# Patient Record
Sex: MALE | Race: WHITE | NOT HISPANIC OR LATINO | Employment: PART TIME | URBAN - METROPOLITAN AREA
[De-identification: names, ages, dates, MRNs, and addresses within clinical notes are randomized per-mention and may not be internally consistent; named-entity substitution may affect disease eponyms.]

---

## 2020-08-05 PROBLEM — H26.492 LEFT POSTERIOR CAPSULAR OPACIFICATION: Status: ACTIVE | Noted: 2020-08-05

## 2020-09-16 PROBLEM — H01.02A SQUAMOUS BLEPHARITIS OF UPPER AND LOWER EYELIDS OF BOTH EYES: Status: ACTIVE | Noted: 2020-09-16

## 2020-09-16 PROBLEM — H18.832 RECURRENT EROSION OF LEFT CORNEA: Status: ACTIVE | Noted: 2020-09-16

## 2020-09-16 PROBLEM — H02.89 DISTICHIASIS: Status: ACTIVE | Noted: 2020-09-16

## 2020-09-16 PROBLEM — H01.02B SQUAMOUS BLEPHARITIS OF UPPER AND LOWER EYELIDS OF BOTH EYES: Status: ACTIVE | Noted: 2020-09-16

## 2020-09-16 PROBLEM — H02.055 TRICHIASIS OF LEFT LOWER EYELID: Status: ACTIVE | Noted: 2020-09-16

## 2021-03-17 PROBLEM — H17.9: Status: ACTIVE | Noted: 2021-03-17

## 2021-04-13 PROBLEM — Z98.890 S/P EYE SURGERY: Status: ACTIVE | Noted: 2021-04-13

## 2022-02-16 PROBLEM — H17.9: Status: ACTIVE | Noted: 2022-02-16

## 2022-06-16 PROBLEM — H02.826 EYELID CYST, LEFT: Status: ACTIVE | Noted: 2022-06-16

## 2022-12-30 PROBLEM — K27.9 PEPTIC ULCER DISEASE: Status: ACTIVE | Noted: 2022-12-30

## 2022-12-30 PROBLEM — Z95.2 H/O AORTIC VALVE REPLACEMENT: Status: ACTIVE | Noted: 2022-12-30

## 2022-12-30 PROBLEM — K70.31 ALCOHOLIC CIRRHOSIS OF LIVER WITH ASCITES: Status: ACTIVE | Noted: 2022-12-30

## 2022-12-30 PROBLEM — E11.9 TYPE 2 DIABETES MELLITUS WITHOUT COMPLICATION, WITHOUT LONG-TERM CURRENT USE OF INSULIN: Status: ACTIVE | Noted: 2022-12-30

## 2022-12-30 PROBLEM — R19.7 DIARRHEA OF PRESUMED INFECTIOUS ORIGIN: Status: ACTIVE | Noted: 2022-12-30

## 2022-12-30 PROBLEM — K92.1 MELENA: Status: ACTIVE | Noted: 2022-12-30

## 2022-12-30 PROBLEM — Z95.1 HX OF CABG: Status: ACTIVE | Noted: 2022-12-30

## 2022-12-30 PROBLEM — I10 ESSENTIAL HYPERTENSION: Status: ACTIVE | Noted: 2022-12-30

## 2022-12-30 PROBLEM — E78.49 OTHER HYPERLIPIDEMIA: Status: ACTIVE | Noted: 2022-12-30

## 2023-01-01 PROBLEM — I48.0 PAROXYSMAL ATRIAL FIBRILLATION: Status: ACTIVE | Noted: 2023-01-01

## 2023-02-09 PROBLEM — I85.10 SECONDARY ESOPHAGEAL VARICES WITHOUT BLEEDING: Status: ACTIVE | Noted: 2023-02-09

## 2023-04-18 PROBLEM — I85.00 ESOPHAGEAL VARICES WITHOUT BLEEDING: Status: ACTIVE | Noted: 2023-04-18

## 2023-06-12 PROBLEM — H04.129 DRY EYE SYNDROME DUE TO MEIBOMIAN GLAND DYSFUNCTION: Status: ACTIVE | Noted: 2023-06-12

## 2023-06-12 PROBLEM — Z94.7 HISTORY OF PENETRATING KERATOPLASTY: Status: ACTIVE | Noted: 2023-06-12

## 2023-06-12 PROBLEM — H02.889 DRY EYE SYNDROME DUE TO MEIBOMIAN GLAND DYSFUNCTION: Status: ACTIVE | Noted: 2023-06-12

## 2023-06-12 PROBLEM — H26.491 RIGHT POSTERIOR CAPSULAR OPACIFICATION: Status: ACTIVE | Noted: 2023-06-12

## 2023-09-08 ENCOUNTER — TELEPHONE (OUTPATIENT)
Dept: CARDIOLOGY | Facility: CLINIC | Age: 68
End: 2023-09-08
Payer: COMMERCIAL

## 2023-09-08 NOTE — TELEPHONE ENCOUNTER
Contacted patient to schedule referral from Dr. Granados for aortic valve disease. Patient and spouse state that currently he is feeling good, and able to complete tasks such as working, and other adl's. Patients' wife states that the commute to [place] is too much time cui, and will take them both away from work, and that is not anything that they are prepared to do right now. IC staff gave patient and spouse office contact information. Patient verbalized understanding, and stated that they will call back when they are ready to make an appointment.

## 2023-09-22 ENCOUNTER — DOCUMENTATION ONLY (OUTPATIENT)
Dept: CARDIOLOGY | Facility: CLINIC | Age: 68
End: 2023-09-22
Payer: COMMERCIAL

## 2023-09-26 ENCOUNTER — OFFICE VISIT (OUTPATIENT)
Dept: CARDIOLOGY | Facility: CLINIC | Age: 68
End: 2023-09-26
Payer: COMMERCIAL

## 2023-09-26 DIAGNOSIS — Z95.2 H/O AORTIC VALVE REPLACEMENT: ICD-10-CM

## 2023-09-26 PROCEDURE — 1159F MED LIST DOCD IN RCRD: CPT | Mod: CPTII,95,, | Performed by: INTERNAL MEDICINE

## 2023-09-26 PROCEDURE — 1160F RVW MEDS BY RX/DR IN RCRD: CPT | Mod: CPTII,95,, | Performed by: INTERNAL MEDICINE

## 2023-09-26 PROCEDURE — 99214 OFFICE O/P EST MOD 30 MIN: CPT | Mod: 95,,, | Performed by: INTERNAL MEDICINE

## 2023-09-26 PROCEDURE — 1159F PR MEDICATION LIST DOCUMENTED IN MEDICAL RECORD: ICD-10-PCS | Mod: CPTII,95,, | Performed by: INTERNAL MEDICINE

## 2023-09-26 PROCEDURE — 99214 PR OFFICE/OUTPT VISIT, EST, LEVL IV, 30-39 MIN: ICD-10-PCS | Mod: 95,,, | Performed by: INTERNAL MEDICINE

## 2023-09-26 PROCEDURE — 1160F PR REVIEW ALL MEDS BY PRESCRIBER/CLIN PHARMACIST DOCUMENTED: ICD-10-PCS | Mod: CPTII,95,, | Performed by: INTERNAL MEDICINE

## 2023-09-26 NOTE — ASSESSMENT & PLAN NOTE
Significant aortic valve regurgitation and paravalvular leak from outside TTE and REESE.  He is currently optimized medically and exercises with minimal symptoms.  Lab demonstrates anemia with a hemoglobin of 7.8 and no haptoglobin available.  Recommend TAVR CTA, transesophageal echo by structural echocardiography at [facility], HTS consult, CTS consult, and clinic visit with me all in the same day and then presentation at multidisciplinary structural meeting when this data set is complete.  Patient and wife have agreed to plan and my nurse will arrange.

## 2023-09-26 NOTE — PROGRESS NOTES
New Patient - Audio Only Telehealth Visit     The patient location is: home  The chief complaint leading to consultation is:  Prosthetic aortic valve regurgitation and probable paravalvular leak.  Visit type: Virtual visit with audio only (telephone)  Total time spent with patient:  30 minutes  Total time spent on visit and documentation and chart review = 45 mins.        The reason for the audio only service rather than synchronous audio and video virtual visit was related to technical difficulties or patient preference/necessity.     Each patient to whom I provide medical services by telemedicine is:  (1) informed of the relationship between the physician and patient and the respective role of any other health care provider with respect to management of the patient; and (2) notified that they may decline to receive medical services by telemedicine and may withdraw from such care at any time. Patient verbally consented to receive this service via voice-only telephone call.       HPI:  This is a 67-year-old male with multiple medical problems including prior coronary artery bypass and aortic valve replacement 8 years prior with aortic valve who also has a history of hypertension paroxysmal atrial fibrillation esophageal varices with bleeding due to alcoholic cirrhosis who was referred by Dr. Granados for a 3rd opinion regarding possible TAVR.  In the past 1 year patient has discontinued alcohol, reduced his weight from 210 lb to 160 lb has completed cardiac rehab and is currently walking 30 minutes on the treadmill daily and performing 20 minutes hand ergometer daily with no significant physical limitations or shortness of breath.  He remains anemic with a hemoglobin of 7, has no haptoglobin available in labs and has a transesophageal echo and transthoracic echo which demonstrate no significant aortic stenosis however significant aortic regurgitation with most likely paravalvular leak.       H/O aortic valve  replacement  Significant aortic valve regurgitation and paravalvular leak from outside TTE and REESE.  He is currently optimized medically and exercises with minimal symptoms.  Lab demonstrates anemia with a hemoglobin of 7.8 and no haptoglobin available.  Recommend TAVR CTA, transesophageal echo by structural echocardiography at [facility], HTS consult, CTS consult, and clinic visit with me all in the same day and then presentation at multidisciplinary structural meeting when this data set is complete.  Patient and wife have agreed to plan and my nurse will arrange.

## 2023-10-02 DIAGNOSIS — Z95.2 H/O AORTIC VALVE REPLACEMENT: Primary | ICD-10-CM

## 2023-10-03 DIAGNOSIS — I35.1 AORTIC VALVE INSUFFICIENCY, ETIOLOGY OF CARDIAC VALVE DISEASE UNSPECIFIED: Primary | ICD-10-CM

## 2023-11-13 ENCOUNTER — HOSPITAL ENCOUNTER (OUTPATIENT)
Dept: RADIOLOGY | Facility: HOSPITAL | Age: 68
Discharge: HOME | End: 2023-11-13
Attending: INTERNAL MEDICINE
Payer: COMMERCIAL

## 2023-11-13 DIAGNOSIS — I35.1 AORTIC VALVE INSUFFICIENCY, ETIOLOGY OF CARDIAC VALVE DISEASE UNSPECIFIED: ICD-10-CM

## 2023-11-13 PROCEDURE — 74174 CTA CARDIAC TAVR_PARTNERS (XPD): ICD-10-PCS | Mod: 26,,, | Performed by: STUDENT IN AN ORGANIZED HEALTH CARE EDUCATION/TRAINING PROGRAM

## 2023-11-13 PROCEDURE — 74174 CTA ABD&PLVS W/CONTRAST: CPT | Mod: 26,,, | Performed by: STUDENT IN AN ORGANIZED HEALTH CARE EDUCATION/TRAINING PROGRAM

## 2023-11-13 PROCEDURE — 71275 CTA CARDIAC TAVR_PARTNERS (XPD): ICD-10-PCS | Mod: 26,,, | Performed by: STUDENT IN AN ORGANIZED HEALTH CARE EDUCATION/TRAINING PROGRAM

## 2023-11-13 PROCEDURE — 74174 CTA ABD&PLVS W/CONTRAST: CPT | Mod: TC

## 2023-11-13 PROCEDURE — 71275 CT ANGIOGRAPHY CHEST: CPT | Mod: 26,,, | Performed by: STUDENT IN AN ORGANIZED HEALTH CARE EDUCATION/TRAINING PROGRAM

## 2023-11-13 PROCEDURE — 71275 CT ANGIOGRAPHY CHEST: CPT | Mod: TC

## 2023-11-13 PROCEDURE — 25500020 PHARM REV CODE 255: Performed by: INTERNAL MEDICINE

## 2023-11-13 RX ADMIN — IOHEXOL 100 ML: 350 INJECTION, SOLUTION INTRAVENOUS at 03:11

## 2023-11-13 NOTE — PROGRESS NOTES
Subjective:      Patient ID: Asif Nolen is a 67 y.o. male.    Chief Complaint: No chief complaint on file.      HPI:  Asif Nolen is a 67 y.o. male who presents for surgical evaluation of bioprosthetic aortic valve regurgitation. Medical conditions include arthritis, HTN, DM2, vision loss, s/p CABG and AVR 8 years ago with Dr. Garcia, afib, alcoholic cirrhosis with esophageal varices s/p banding, thrombocytopenia (platelets 115). Reports that around February he started feeling very tired and that was when his valvular problem was discovered. He has since started cardiac rehab in August and has been going 3 times per week. He reports this has really helped him get stronger and feel better. Denies any shortness of breath with ADLs. Can walk a decent distance before he feels winded. No chest pain, lower extremity swelling. Occasional dizziness. Quit drinking. No prior strokes, seizures, blood clots, stents. Reports his father had two open heart operations. There is an extensive cardiac history among all of his father's side of the family.      Family and social history reviewed    Current Outpatient Medications   Medication Instructions    benzonatate (TESSALON) 100 MG capsule SMARTSI Capsule(s) By Mouth Every 8 Hours PRN    calcium carbonate/vitamin D3 (CALCIUM 600 + D,3, ORAL) 1 tablet, Oral, Daily    cycloSPORINE (RESTASIS) 0.05 % ophthalmic emulsion 1 drop, Both Eyes, 2 times daily    doxycycline (VIBRAMYCIN) 100 mg, Oral, 2 times daily    ergocalciferol, vitamin D2, (VITAMIN D ORAL) 1 tablet, Oral, Daily    ezetimibe (ZETIA) 10 mg tablet No dose, route, or frequency recorded.    furosemide (LASIX) 40 mg, Oral, Daily    IRON, FERROUS SULFATE, ORAL 1 tablet, Oral, Every other day    lactulose (CHRONULAC) 20 g, Oral, 2 times daily    metFORMIN (GLUCOPHAGE) 500 mg, Oral, With breakfast    omega-3 fatty acids/fish oil (FISH OIL-OMEGA-3 FATTY ACIDS) 300-1,000 mg capsule 1 capsule, Oral, Daily    omeprazole  (PRILOSEC) 20 mg, Daily    omeprazole (PRILOSEC) 10 mg, Oral, Daily    prednisoLONE acetate (PRED FORTE) 1 % DrpS 1 drop, Left Eye, 4 times daily    promethazine-codeine 6.25-10 mg/5 ml (PHENERGAN WITH CODEINE) 6.25-10 mg/5 mL syrup 5-10 mLs, Oral, Every 4 hours PRN    promethazine-dextromethorphan (PROMETHAZINE-DM) 6.25-15 mg/5 mL Syrp 5 mLs, Oral, Every 6 hours PRN    spironolactone (ALDACTONE) 25 mg, Oral, Daily    tiZANidine (ZANAFLEX) 4 MG tablet SMARTSI Tablet(s) By Mouth Every 12 Hours PRN   \    Review of patient's allergies indicates:  No Known Allergies  Past Medical History:   Diagnosis Date    Arthritis     Cataract     Coronary artery disease     Digestive disorder     Encounter for blood transfusion 2023    Heart disease     Hypertension     Type 2 diabetes mellitus without complication, without long-term current use of insulin 2022    Vision loss      Past Surgical History:   Procedure Laterality Date    CARDIAC SURGERY      CATARACT EXTRACTION Bilateral     CORNEAL TRANSPLANT Left 2021    L cubital tunnel release      open heart surg      PENETRATING KERATOPLASTY Left 3/29/2021    Procedure: KERATOPLASTY, PENETRATING;  Surgeon: Jerome Connors MD;  Location: [facility] OR;  Service: Ophthalmology;  Laterality: Left;    RETINAL DETACHMENT SURGERY      surgery keratectomy      TRANSESOPHAGEAL ECHOCARDIOGRAPHY N/A 2023    Procedure: ECHOCARDIOGRAM, TRANSESOPHAGEAL;  Surgeon: Jaswant Hercules MD;  Location: [facility] CATH LAB;  Service: Cardiology;  Laterality: N/A;     Family History       Problem Relation (Age of Onset)    Heart disease Father    No Known Problems Mother          Social History     Socioeconomic History    Marital status:    Tobacco Use    Smoking status: Former     Types: Cigarettes    Smokeless tobacco: Never    Tobacco comments:     Quit smoking 15 years ago   Substance and Sexual Activity    Alcohol use: Not Currently    Drug use: Never    Sexual  activity: Yes     Partners: Female     Social Determinants of Health     Financial Resource Strain: Low Risk  (12/30/2022)    Overall Financial Resource Strain (CARDIA)     Difficulty of Paying Living Expenses: Not hard at all   Food Insecurity: No Food Insecurity (12/30/2022)    Hunger Vital Sign     Worried About Running Out of Food in the Last Year: Never true     Ran Out of Food in the Last Year: Never true   Transportation Needs: No Transportation Needs (12/30/2022)    PRAPARE - Transportation     Lack of Transportation (Medical): No     Lack of Transportation (Non-Medical): No   Physical Activity: Sufficiently Active (12/30/2022)    Exercise Vital Sign     Days of Exercise per Week: 4 days     Minutes of Exercise per Session: 60 min   Stress: No Stress Concern Present (12/30/2022)    [employer] - Occupational Stress Questionnaire     Feeling of Stress : Not at all   Social Connections: Socially Integrated (12/30/2022)    Social Connection and Isolation Panel [NHANES]     Frequency of Communication with Friends and Family: More than three times a week     Frequency of Social Gatherings with Friends and Family: More than three times a week     Attends [employer] Services: More than 4 times per year     Active Member of Clubs or Organizations: Yes     Attends Club or Organization Meetings: More than 4 times per year     Marital Status:    Housing Stability: Unknown (12/30/2022)    Housing Stability Vital Sign     Unable to Pay for Housing in the Last Year: No     Unstable Housing in the Last Year: No       Current medications Reviewed    Review of Systems   Constitutional:  Negative for activity change.   HENT:  Negative for nosebleeds.    Eyes:  Positive for visual disturbance.   Respiratory:  Negative for shortness of breath.    Cardiovascular:  Negative for chest pain and leg swelling.   Gastrointestinal:  Negative for nausea.   Musculoskeletal:  Negative for gait problem.    Skin:  Negative for color change.   Neurological:  Positive for dizziness. Negative for seizures.   Hematological:  Does not bruise/bleed easily.   Psychiatric/Behavioral:  Negative for sleep disturbance.      Objective:   Physical Exam  Vitals reviewed.   Constitutional:       General: He is not in acute distress.     Appearance: He is well-developed. He is not diaphoretic.   HENT:      Head: Normocephalic and atraumatic.   Eyes:      Conjunctiva/sclera: Conjunctivae normal.   Neck:      Vascular: No JVD.   Cardiovascular:      Rate and Rhythm: Normal rate.   Pulmonary:      Effort: Pulmonary effort is normal. No respiratory distress.   Musculoskeletal:         General: Normal range of motion.      Cervical back: Normal range of motion.   Skin:     Coloration: Skin is not pale.   Neurological:      Mental Status: He is alert. Mental status is at baseline.   Psychiatric:         Speech: Speech normal.         Behavior: Behavior normal.         Thought Content: Thought content normal.         Judgment: Judgment normal.         Diagnostic Results:   US abdomen 11/8/23  Findings are suggestive of hepatic cirrhosis.  Recommend further evaluation with liver elastography.  No sonographic evidence of focal hepatic lesions.     REESE 7/19/23  REESE was performed to assess prosthetic aortic valve.  There is a 25 mm Perimount bioprosthetic aortic valve present. The bioprosthetic valve and the leaflets are not well visualized. Degenerative changes noted.  There is bioprosthetic aortic valve dysfunction with at least moderate, very eccentric, posteriorly directed transvalvular regurgitation. PHT between 280-315 ms. No aortic holodiastolic flow reversal.  There is paravalvular regurgitation anteriorly and posteriorly. Unable to perform accurate quantification due to anterior drop out artifact in the posterior aspect of the aortic root. Probable moderate to severe.  There is a 2-3 mm echolucent space in the posterior aspect of the  aortic root and the posterior aspect is not well visualized due to significant drop out. Recommend cardiac CT to further evaluate for structural abnormalities.  There is no hemodynamic evidence of aortic stenosis with peak velocity of 2.95 m/s, mean gradient 14 mmHg and AT <100 ms.  The left ventricle is normal in size with hyperdynamic systolic function.  The estimated ejection fraction is 70-75%.    TTE 5/31/23  The left ventricle is normal in size with mild concentric hypertrophy and normal systolic function.  The estimated ejection fraction is 65%. Normal wall motion  There is a 25 mm Perimount bioprosthetic valve in the aortic position, well-seated. Degenerative changes of the aortic prosthesis. Severe valvular regurgitation by visual assessment (doppler parameters probably underestimate due to suboptimal doppler alignment with the eccentric regurgitant jet). No holodiastolic flow reversal in descending aorta. Posteriorly directed regurgitant jet. Cannot exclude (significant valvular regurgitation limits perivalvular assessment) small perivalvular leak. No hemodynamically significant stenosis (Vmax=3.2m/s, mean PG=18, DVI=0.64, AT=87msec). Mean PG and Vmax are overestimated due to concomitant severe regurgitation  Mild mitral regurgitation.  Normal right ventricular size with normal right ventricular systolic function.  Intermediate central venous pressure (8 mmHg).  The estimated PA systolic pressure is 34 mmHg.  Consider REESE for further evaluation after GI clearance    Assessment:     Plan:     CTS Attending Note:    I have personally taken the history and examined this patient and agree with the KENZIE's note as stated above.  Very pleasant 67-year-old gentleman with previous aortic valve replacement and bypass surgery.  He now has transvalvular and perivalvular aortic insufficiency.  He had been quite symptomatic for this, but since starting cardiac rehab in August his functional status is much improved.  His  past medical history is noteworthy for cirrhosis with esophageal varices that required banding.  I reviewed his most recent lab work, and his liver functions appear normal, although he is thrombocytopenic.  I think he likely has compensated cirrhosis.  I think the best strategy would be transcatheter valve, as I think reoperation would likely lead to hepatic decompensation.  He is scheduled to see Dr. Haley today.

## 2023-11-14 ENCOUNTER — HOSPITAL ENCOUNTER (OUTPATIENT)
Dept: CARDIOLOGY | Facility: HOSPITAL | Age: 68
Discharge: HOME | End: 2023-11-14
Attending: INTERNAL MEDICINE
Payer: COMMERCIAL

## 2023-11-14 ENCOUNTER — OFFICE VISIT (OUTPATIENT)
Dept: CARDIOLOGY | Facility: CLINIC | Age: 68
End: 2023-11-14
Payer: COMMERCIAL

## 2023-11-14 ENCOUNTER — OFFICE VISIT (OUTPATIENT)
Dept: CARDIOTHORACIC SURGERY | Facility: CLINIC | Age: 68
End: 2023-11-14
Attending: INTERNAL MEDICINE
Payer: COMMERCIAL

## 2023-11-14 VITALS
SYSTOLIC BLOOD PRESSURE: 157 MMHG | WEIGHT: 166.69 LBS | OXYGEN SATURATION: 100 % | DIASTOLIC BLOOD PRESSURE: 64 MMHG | BODY MASS INDEX: 23.86 KG/M2 | HEART RATE: 62 BPM | HEIGHT: 70 IN

## 2023-11-14 VITALS
HEART RATE: 59 BPM | DIASTOLIC BLOOD PRESSURE: 62 MMHG | OXYGEN SATURATION: 100 % | HEIGHT: 70 IN | WEIGHT: 164 LBS | BODY MASS INDEX: 23.48 KG/M2 | SYSTOLIC BLOOD PRESSURE: 138 MMHG

## 2023-11-14 VITALS
HEIGHT: 70 IN | HEART RATE: 56 BPM | DIASTOLIC BLOOD PRESSURE: 42 MMHG | BODY MASS INDEX: 24.05 KG/M2 | WEIGHT: 168 LBS | SYSTOLIC BLOOD PRESSURE: 146 MMHG

## 2023-11-14 DIAGNOSIS — I10 ESSENTIAL HYPERTENSION: ICD-10-CM

## 2023-11-14 DIAGNOSIS — I85.10 SECONDARY ESOPHAGEAL VARICES WITHOUT BLEEDING: ICD-10-CM

## 2023-11-14 DIAGNOSIS — E11.9 TYPE 2 DIABETES MELLITUS WITHOUT COMPLICATION, WITHOUT LONG-TERM CURRENT USE OF INSULIN: ICD-10-CM

## 2023-11-14 DIAGNOSIS — T82.897A AORTIC PROSTHETIC VALVE REGURGITATION, INITIAL ENCOUNTER: Primary | ICD-10-CM

## 2023-11-14 DIAGNOSIS — T82.897A AORTIC PROSTHETIC VALVE REGURGITATION, INITIAL ENCOUNTER: ICD-10-CM

## 2023-11-14 DIAGNOSIS — E78.49 OTHER HYPERLIPIDEMIA: ICD-10-CM

## 2023-11-14 DIAGNOSIS — K70.31 ALCOHOLIC CIRRHOSIS OF LIVER WITH ASCITES: ICD-10-CM

## 2023-11-14 DIAGNOSIS — Z95.2 H/O AORTIC VALVE REPLACEMENT: ICD-10-CM

## 2023-11-14 DIAGNOSIS — I48.0 PAROXYSMAL ATRIAL FIBRILLATION: ICD-10-CM

## 2023-11-14 DIAGNOSIS — Z95.1 HX OF CABG: Primary | ICD-10-CM

## 2023-11-14 DIAGNOSIS — I35.1 AORTIC VALVE INSUFFICIENCY, ETIOLOGY OF CARDIAC VALVE DISEASE UNSPECIFIED: ICD-10-CM

## 2023-11-14 LAB
ASCENDING AORTA: 2.89 CM
AV INDEX (PROSTH): 0.42
AV MEAN GRADIENT: 26 MMHG
AV PEAK GRADIENT: 42 MMHG
AV VALVE AREA BY VELOCITY RATIO: 1.65 CM²
AV VALVE AREA: 1.39 CM²
AV VELOCITY RATIO: 0.51
BSA FOR ECHO PROCEDURE: 1.94 M2
CV ECHO LV RWT: 0.35 CM
DOP CALC AO PEAK VEL: 3.24 M/S
DOP CALC AO VTI: 101.48 CM
DOP CALC LVOT AREA: 3.3 CM2
DOP CALC LVOT DIAMETER: 2.04 CM
DOP CALC LVOT PEAK VEL: 1.64 M/S
DOP CALC LVOT STROKE VOLUME: 140.87 CM3
DOP CALCLVOT PEAK VEL VTI: 43.12 CM
E WAVE DECELERATION TIME: 285.15 MSEC
E/A RATIO: 1.71
E/E' RATIO: 17.6 M/S
ECHO LV POSTERIOR WALL: 1.07 CM (ref 0.6–1.1)
FRACTIONAL SHORTENING: 48 % (ref 28–44)
INTERVENTRICULAR SEPTUM: 1.15 CM (ref 0.6–1.1)
LA MAJOR: 5.76 CM
LA MINOR: 5.33 CM
LA WIDTH: 5.42 CM
LEFT ATRIUM SIZE: 3.99 CM
LEFT ATRIUM VOLUME INDEX MOD: 53.2 ML/M2
LEFT ATRIUM VOLUME INDEX: 52.5 ML/M2
LEFT ATRIUM VOLUME MOD: 103.24 CM3
LEFT ATRIUM VOLUME: 101.77 CM3
LEFT INTERNAL DIMENSION IN SYSTOLE: 3.2 CM (ref 2.1–4)
LEFT VENTRICLE DIASTOLIC VOLUME INDEX: 51.75 ML/M2
LEFT VENTRICLE DIASTOLIC VOLUME: 100.39 ML
LEFT VENTRICLE MASS INDEX: 150 G/M2
LEFT VENTRICLE SYSTOLIC VOLUME INDEX: 15.1 ML/M2
LEFT VENTRICLE SYSTOLIC VOLUME: 29.25 ML
LEFT VENTRICULAR INTERNAL DIMENSION IN DIASTOLE: 6.1 CM (ref 3.5–6)
LEFT VENTRICULAR MASS: 290.93 G
LV LATERAL E/E' RATIO: 14.67 M/S
LV SEPTAL E/E' RATIO: 22 M/S
MV A" WAVE DURATION": 15.98 MSEC
MV PEAK A VEL: 0.77 M/S
MV PEAK E VEL: 1.32 M/S
MV STENOSIS PRESSURE HALF TIME: 82.69 MS
MV VALVE AREA P 1/2 METHOD: 2.66 CM2
PISA TR MAX VEL: 2.86 M/S
PULM VEIN S/D RATIO: 1.06
PV PEAK D VEL: 0.81 M/S
PV PEAK S VEL: 0.86 M/S
RA MAJOR: 5.54 CM
RA PRESSURE ESTIMATED: 3 MMHG
RA WIDTH: 3.56 CM
RIGHT VENTRICULAR END-DIASTOLIC DIMENSION: 4.84 CM
RV TB RVSP: 6 MMHG
SINUS: 3.19 CM
STJ: 2.44 CM
TDI LATERAL: 0.09 M/S
TDI SEPTAL: 0.06 M/S
TDI: 0.08 M/S
TR MAX PG: 33 MMHG
TRICUSPID ANNULAR PLANE SYSTOLIC EXCURSION: 2.51 CM
TV REST PULMONARY ARTERY PRESSURE: 36 MMHG
Z-SCORE OF LEFT VENTRICULAR DIMENSION IN END DIASTOLE: 1.02
Z-SCORE OF LEFT VENTRICULAR DIMENSION IN END SYSTOLE: -0.46

## 2023-11-14 PROCEDURE — 3078F DIAST BP <80 MM HG: CPT | Mod: CPTII,S$GLB,, | Performed by: THORACIC SURGERY (CARDIOTHORACIC VASCULAR SURGERY)

## 2023-11-14 PROCEDURE — 3008F BODY MASS INDEX DOCD: CPT | Mod: CPTII,S$GLB,, | Performed by: THORACIC SURGERY (CARDIOTHORACIC VASCULAR SURGERY)

## 2023-11-14 PROCEDURE — 1160F RVW MEDS BY RX/DR IN RCRD: CPT | Mod: CPTII,S$GLB,, | Performed by: INTERNAL MEDICINE

## 2023-11-14 PROCEDURE — 1126F PR PAIN SEVERITY QUANTIFIED, NO PAIN PRESENT: ICD-10-PCS | Mod: CPTII,S$GLB,, | Performed by: THORACIC SURGERY (CARDIOTHORACIC VASCULAR SURGERY)

## 2023-11-14 PROCEDURE — 99205 OFFICE O/P NEW HI 60 MIN: CPT | Mod: S$GLB,,, | Performed by: INTERNAL MEDICINE

## 2023-11-14 PROCEDURE — 3008F BODY MASS INDEX DOCD: CPT | Mod: CPTII,S$GLB,, | Performed by: INTERNAL MEDICINE

## 2023-11-14 PROCEDURE — 1126F AMNT PAIN NOTED NONE PRSNT: CPT | Mod: CPTII,S$GLB,, | Performed by: THORACIC SURGERY (CARDIOTHORACIC VASCULAR SURGERY)

## 2023-11-14 PROCEDURE — 99999 PR PBB SHADOW E&M-EST. PATIENT-LVL V: ICD-10-PCS | Mod: PBBFAC,,, | Performed by: INTERNAL MEDICINE

## 2023-11-14 PROCEDURE — 3075F PR MOST RECENT SYSTOLIC BLOOD PRESS GE 130-139MM HG: ICD-10-PCS | Mod: CPTII,S$GLB,, | Performed by: INTERNAL MEDICINE

## 2023-11-14 PROCEDURE — 3008F PR BODY MASS INDEX (BMI) DOCUMENTED: ICD-10-PCS | Mod: CPTII,S$GLB,, | Performed by: INTERNAL MEDICINE

## 2023-11-14 PROCEDURE — 1160F PR REVIEW ALL MEDS BY PRESCRIBER/CLIN PHARMACIST DOCUMENTED: ICD-10-PCS | Mod: CPTII,S$GLB,, | Performed by: INTERNAL MEDICINE

## 2023-11-14 PROCEDURE — 99205 PR OFFICE/OUTPT VISIT, NEW, LEVL V, 60-74 MIN: ICD-10-PCS | Mod: S$GLB,,, | Performed by: INTERNAL MEDICINE

## 2023-11-14 PROCEDURE — 99204 PR OFFICE/OUTPT VISIT, NEW, LEVL IV, 45-59 MIN: ICD-10-PCS | Mod: S$GLB,,, | Performed by: THORACIC SURGERY (CARDIOTHORACIC VASCULAR SURGERY)

## 2023-11-14 PROCEDURE — 93306 TTE W/DOPPLER COMPLETE: CPT

## 2023-11-14 PROCEDURE — 3078F PR MOST RECENT DIASTOLIC BLOOD PRESSURE < 80 MM HG: ICD-10-PCS | Mod: CPTII,S$GLB,, | Performed by: INTERNAL MEDICINE

## 2023-11-14 PROCEDURE — 3075F SYST BP GE 130 - 139MM HG: CPT | Mod: CPTII,S$GLB,, | Performed by: INTERNAL MEDICINE

## 2023-11-14 PROCEDURE — 99999 PR PBB SHADOW E&M-EST. PATIENT-LVL III: CPT | Mod: PBBFAC,,, | Performed by: THORACIC SURGERY (CARDIOTHORACIC VASCULAR SURGERY)

## 2023-11-14 PROCEDURE — 93306 ECHO (CUPID ONLY): ICD-10-PCS | Mod: 26,,, | Performed by: INTERNAL MEDICINE

## 2023-11-14 PROCEDURE — 3078F DIAST BP <80 MM HG: CPT | Mod: CPTII,S$GLB,, | Performed by: INTERNAL MEDICINE

## 2023-11-14 PROCEDURE — 3077F PR MOST RECENT SYSTOLIC BLOOD PRESSURE >= 140 MM HG: ICD-10-PCS | Mod: CPTII,S$GLB,, | Performed by: THORACIC SURGERY (CARDIOTHORACIC VASCULAR SURGERY)

## 2023-11-14 PROCEDURE — 1159F MED LIST DOCD IN RCRD: CPT | Mod: CPTII,S$GLB,, | Performed by: INTERNAL MEDICINE

## 2023-11-14 PROCEDURE — 99999 PR PBB SHADOW E&M-EST. PATIENT-LVL V: CPT | Mod: PBBFAC,,, | Performed by: INTERNAL MEDICINE

## 2023-11-14 PROCEDURE — 3077F SYST BP >= 140 MM HG: CPT | Mod: CPTII,S$GLB,, | Performed by: THORACIC SURGERY (CARDIOTHORACIC VASCULAR SURGERY)

## 2023-11-14 PROCEDURE — 1126F AMNT PAIN NOTED NONE PRSNT: CPT | Mod: CPTII,S$GLB,, | Performed by: INTERNAL MEDICINE

## 2023-11-14 PROCEDURE — 99999 PR PBB SHADOW E&M-EST. PATIENT-LVL III: ICD-10-PCS | Mod: PBBFAC,,, | Performed by: THORACIC SURGERY (CARDIOTHORACIC VASCULAR SURGERY)

## 2023-11-14 PROCEDURE — 1126F PR PAIN SEVERITY QUANTIFIED, NO PAIN PRESENT: ICD-10-PCS | Mod: CPTII,S$GLB,, | Performed by: INTERNAL MEDICINE

## 2023-11-14 PROCEDURE — 99204 OFFICE O/P NEW MOD 45 MIN: CPT | Mod: S$GLB,,, | Performed by: THORACIC SURGERY (CARDIOTHORACIC VASCULAR SURGERY)

## 2023-11-14 PROCEDURE — 93306 TTE W/DOPPLER COMPLETE: CPT | Mod: 26,,, | Performed by: INTERNAL MEDICINE

## 2023-11-14 PROCEDURE — 3008F PR BODY MASS INDEX (BMI) DOCUMENTED: ICD-10-PCS | Mod: CPTII,S$GLB,, | Performed by: THORACIC SURGERY (CARDIOTHORACIC VASCULAR SURGERY)

## 2023-11-14 PROCEDURE — 3078F PR MOST RECENT DIASTOLIC BLOOD PRESSURE < 80 MM HG: ICD-10-PCS | Mod: CPTII,S$GLB,, | Performed by: THORACIC SURGERY (CARDIOTHORACIC VASCULAR SURGERY)

## 2023-11-14 PROCEDURE — 1159F PR MEDICATION LIST DOCUMENTED IN MEDICAL RECORD: ICD-10-PCS | Mod: CPTII,S$GLB,, | Performed by: INTERNAL MEDICINE

## 2023-11-14 RX ORDER — ASCORBIC ACID 500 MG
500 TABLET ORAL DAILY
COMMUNITY

## 2023-11-14 RX ORDER — LANOLIN ALCOHOL/MO/W.PET/CERES
5000 CREAM (GRAM) TOPICAL DAILY
COMMUNITY

## 2023-11-14 NOTE — PROGRESS NOTES
Interventional Cardiology Clinic  PATIENT: Asif Nolen  MRN: [id number]   PCP: Khadijah, Primary Doctor   Date of Service: 11/14/2023    Chief Complaint   Patient presents with    Aortic Stenosis       History of Present Illness:   67 y.o. with hx of DM, HTN, CAD, s/p CABG ( LIMA to LAD) and AVR with  25 mm Perimount valve 2016 at  , liver cirrhosis with grade 4-5 varices, recent banding in Jan 2023, hx of retinal detachment.     Pt was noted to have degeneration of bioprosthetic valve at [place], with stenosis, TAVR aborted due to low intraprocedural gradient across the aortic valve. Pt here for second opinion.     Pt reports occ BARNARD, but can walk 1/2 block, no c/p no LE edema , no syncope, no palpitations     Denies current Tob, ETOH or drug use     Reports he still has BARNARD and fatigue , limiting his exercise tolerance  No c/p no syncope no LE edema    Past Medical History:   Diagnosis Date    Arthritis     Cataract     Coronary artery disease     Digestive disorder     Encounter for blood transfusion 04/13/2023    Heart disease     Hypertension     Type 2 diabetes mellitus without complication, without long-term current use of insulin 12/30/2022    Vision loss      Review of patient's allergies indicates:  No Known Allergies  Family History   Problem Relation Age of Onset    No Known Problems Mother     Heart disease Father     Amblyopia Neg Hx     Blindness Neg Hx     Cataracts Neg Hx     Glaucoma Neg Hx     Macular degeneration Neg Hx     Retinal detachment Neg Hx     Strabismus Neg Hx      Social History:  Social History     Tobacco Use    Smoking status: Former     Types: Cigarettes    Smokeless tobacco: Never    Tobacco comments:     Quit smoking 15 years ago   Substance Use Topics    Alcohol use: Not Currently     Medications have been reviewed and reconciled.  Outpatient Medications Marked as Taking for the 11/14/23 encounter (Office Visit) with Reinier Haley MD   Medication Sig Dispense Refill     "ascorbic acid, vitamin C, (VITAMIN C) 500 MG tablet Take 500 mg by mouth once daily.      calcium carbonate/vitamin D3 (CALCIUM 600 + D,3, ORAL) Take 1 tablet by mouth Daily.      furosemide (LASIX) 40 MG tablet Take 1 tablet (40 mg total) by mouth once daily. 30 tablet 4    lactulose (CHRONULAC) 10 gram/15 mL solution Take 30 mLs (20 g total) by mouth 2 (two) times daily. 1800 mL 3    omega-3 fatty acids/fish oil (FISH OIL-OMEGA-3 FATTY ACIDS) 300-1,000 mg capsule Take 1 capsule by mouth once daily.      omeprazole (PRILOSEC) 10 MG capsule Take 10 mg by mouth once daily.      prednisoLONE acetate (PRED FORTE) 1 % DrpS Place 1 drop into the left eye 4 (four) times daily. 15 mL 6    spironolactone (ALDACTONE) 25 MG tablet Take 1 tablet (25 mg total) by mouth once daily. 30 tablet 4       Review of Systems:  Pertinent positive and negative as mentioned below, otherwise a full review of systems was negative.  Cardiac- No palpitations,  No syncope, No PND, No orthopnea,   Pulmonary- No cough, No wheezing, No stridor  Gastrointestinal- No dysphagia,  No vomiting, No constipation, No diarrhea    Physical Exam:  Vitals reviewed,   /62 (BP Location: Right arm, Patient Position: Sitting, BP Method: Large (Automatic))   Pulse (!) 59   Ht 5' 10" (1.778 m)   Wt 74.4 kg (164 lb 0.4 oz)   SpO2 100%   BMI 23.53 kg/m²   Physical Exam  Constitutional:       General: He is not in acute distress.     Appearance: Normal appearance. He is normal weight. He is not ill-appearing.   HENT:      Head: Normocephalic and atraumatic.      Mouth/Throat:      Mouth: Mucous membranes are moist.   Cardiovascular:      Rate and Rhythm: Normal rate and regular rhythm.      Pulses: Normal pulses.      Heart sounds: Murmur heard.      Crescendo decrescendo systolic murmur is present with a grade of 3/6.   Pulmonary:      Effort: Pulmonary effort is normal. No respiratory distress.      Breath sounds: No wheezing or rales.   Abdominal:      " "General: Abdomen is flat. Bowel sounds are normal. There is no distension.      Palpations: Abdomen is soft.   Musculoskeletal:         General: No swelling or tenderness.      Right lower leg: No edema.      Left lower leg: No edema.   Skin:     General: Skin is warm.      Coloration: Skin is not jaundiced.   Neurological:      General: No focal deficit present.      Mental Status: He is alert and oriented to person, place, and time.   Psychiatric:         Mood and Affect: Mood normal.       Data/results:  Laboratory Tests, reviewed today   Lab Results   Component Value Date     (L) 11/14/2023     (L) 11/14/2023     08/14/2019    K 4.8 11/14/2023    K 4.8 11/14/2023    K 3.7 08/14/2019    CL 95 11/14/2023    CL 95 11/14/2023    CO2 25 11/14/2023    CO2 25 11/14/2023    CO2 29 08/14/2019    BUN 16 11/14/2023    BUN 16 11/14/2023    CREATININE 1.0 11/14/2023    CREATININE 1.0 11/14/2023    CREATININE 0.75 08/14/2019    ANIONGAP 6 (L) 11/14/2023    ANIONGAP 6 (L) 11/14/2023     No results found for: "HGBA1C"  Lab Results   Component Value Date     (H) 05/15/2023    Lab Results   Component Value Date    WBC 3.58 (L) 11/14/2023    HGB 9.6 (L) 11/14/2023    HCT 29.4 (L) 11/14/2023     (L) 11/14/2023    GRAN 2.4 11/14/2023    GRAN 67.1 11/14/2023     No results found for: "CHOL", "HDL", "LDLCALC", "TRIG"       Images   Coronary angiography findings/intervention (images independently visualized the images and personally interpreted):  Old records from the chart reviewed.    Assessment and Plan  Asif was seen today for aortic stenosis.    Diagnoses and all orders for this visit:    Hx of CABG    H/O aortic valve replacement    Aortic prosthetic valve regurgitation, initial encounter    Essential hypertension    Other hyperlipidemia    Paroxysmal atrial fibrillation    Type 2 diabetes mellitus without complication, without long-term current use of insulin    Alcoholic cirrhosis of liver with " ascites    Secondary esophageal varices without bleeding       Asif Nolen is a 67 y.o. y.o. male with severe aortic regurgitation and an enlarged left ventricular en diastolic internal diameter.     Asif Nolen is a 67 y.o. male referred by Dr Granados for evaluation of failing bioprosthetic valve with severe AR (NYHA Class III sx).    The patient has undergone the following TAVR work-up:   ECHO (Date 11/14/2023): AYAH=  cm2, MG= mmHg, Peak Akira=  m/s, EF= %.   LHC (Date ): Need to get records from OSH   STS: 4%   Frailty: 1/4   Iliacs are >7 on R and > 6.5 on L   LVOT area by CTA is 3.76 cm2 (26.6 mm X 20.2 mm) and Avg Diameter is 21.9 per Dr Haley  Incidental findings on CT: Need to complete  CT Surgery risk assessment: High risk, per Dr Galan due to esophageal varices, redo sternotomy, thrombocytopenia  Rhythm issues: Afib  PFTs: deferred   KCCQ/5 meter walk: 3.9  Comorbidities: Afib, liver cirrhosis, prior sternotomy, HTN      Asif Nolen is a  mm   valve candidate via  access.    Will continue with workup and plan for TAVR. Will assess feasibility of Janes with industry. Int he meantime, will request LH and notes from GI for safety of REESE.      Signature  Jaswinder Jessica MD  Cardiology  [facility]    I have seen the patient, reviewed the Fellow's history and physical, assessment and plan. I have personally interviewed and examined the patient and agree with the findings.  Sixty-six year prosthetic aortic valve deterioration and regurgitation and paravalvular leak.  Recommend valve in valve aortic and possible paravalvular leak repair.      NICOLE Haley MD

## 2023-11-17 LAB
CREAT SERPL-MCNC: 0.9 MG/DL (ref 0.5–1.4)
SAMPLE: NORMAL

## 2023-11-27 ENCOUNTER — DOCUMENTATION ONLY (OUTPATIENT)
Dept: CARDIAC CATH/INVASIVE PROCEDURES | Facility: HOSPITAL | Age: 68
End: 2023-11-27
Payer: COMMERCIAL

## 2023-11-27 NOTE — PROGRESS NOTES
Asif oNlen is a 67 y.o. y.o. male with severe aortic regurgitation and an enlarged left ventricular en diastolic internal diameter.      Asif Nolen is a 67 y.o. male referred by Dr Granados for evaluation of failing bioprosthetic valve with severe AR (NYHA Class III sx).     The patient has undergone the following TAVR work-up:   ECHO (Date 11/14/2023): AYAH= 1.39 cm2, MG= 26 mmHg, Peak Akira= 3.24 m/s, EF= 65%. DI: 0.42  LHC (Date ): non-obstructive coronary disease (Need to get records from OSH)   STS: 4%   Frailty: 1/4   Iliacs are >7 on R and > 6.5 on L   LVOT area by CTA is 3.76 cm2 (26.6 mm X 20.2 mm) and Avg Diameter is 21.9 per Dr Haley  Incidental findings on CT: Cirrhotic liver morphology. Sequela of portal venous hypertension including splenomegaly and upper abdominal venous collateral vessel formation. Avascular necrosis of the femoral heads.  CT Surgery risk assessment: High risk, per Dr Galan due to esophageal varices, redo sternotomy, thrombocytopenia  Rhythm issues: Afib  PFTs: deferred   KCCQ/5 meter walk: 3.9  Comorbidities: Afib, liver cirrhosis, prior sternotomy, HTN        Asif Nolen is a 26 mm  Medtronic Evolute valve candidate via RTFA access.     Need records for LHC and notes from GI for safety of REESE.    ** check echo criteria

## 2023-11-29 ENCOUNTER — DOCUMENTATION ONLY (OUTPATIENT)
Dept: CARDIOLOGY | Facility: CLINIC | Age: 68
End: 2023-11-29
Payer: COMMERCIAL

## 2023-11-29 NOTE — PROGRESS NOTES
Transcatheter Valve Replacement (TAVR)    You have been scheduled for your valve replacement on Thursday, December 21, 2023.     Please report to the Cardiology Waiting Area on the Third floor of the hospital and check in at 6 AM.   You will then be taken to the SSCU (Short Stay Cardiac Unit) and prepared for your procedure. Please be aware that this is not the time of your procedure but the time you are to arrive.     Preperations for your procedure:  Shower with Dial soap the night before and the morning of your procedure.  No solid foods 8 hours prior to your procedure.  You may have clear liquids until the time of your admission which should be 2 hours prior to your procedure.  You are encouraged to have at least 8 ounces of clear liquids prior to admission to SSCU.  Patients with gastric emptying issues should be fasting for 6- 8 hours prior to the procedure.  Clear liquids include water, black coffee, clear juices, and performance drinks - no pulp or milk.    Heart failure or dialysis patients will be limited to 8 ounces (1 cup) of clear liquids up until 2 hours of the procedure.  You may take your regular morning medications         with as much water as necessary.   Bring your cane and/or walker with you to the hospital.  Bring CPAP/BiPAP, or oxygen if you are on oxygen at home.  Call the office for any signs of infection ( fever, cough, pneumonia, urinary tract, etc.) We cannot implant a device in an infected patient.    Medications:  You may take your regular scheduled medications the morning of your procedure with as much water as necessary.  If you are diabetic and on Metformin (Glucophage), do not take it the day before, the day of, and 2 days after your procedure.  If you are on Pradaxa, Xarelto, Eliquis, or Coumadin hold 3 days prior to your procedure.     How long will the procedure take?  The entire procedure takes three to four hours.  After the procedure, you will go to an ICU where you will be  monitored closely for the next several hours.  You will remain in the ICU overnight.        If you walk with a cane or walker, please bring it with you to the hospital so that we can get you out of bed several hours after your valve replacement.  Our goal is to get you up in a chair and moving as quickly as possible as long as it is safe for you to do so.    When can I go home?  Your length of stay in the hospital, will be determined by your physician.  Be prepared to stay 1-3 days.  You will be discharged when your physician thinks it is safe for you to go home.  You must have someone to drive you home when you are discharged.    Follow Up After Valve Replacement:  It is required that you return to [facility] for the follow up in one month and one year with an echo, lab work, and a clinic visit.  If you are in a research trial, your follow up will be slightly different and according to the trial requirements.  You can follow up with your regular cardiologist regarding any other heart issues.    Contacts one of our nurses at [phone] for any questions.  MARIO Perez RN    Your last dose of Metformin (Glucophage) on Sunday, December 17 2023. You will resume your medications on Sunday December 24, 2023.    Continue your other current medications.      THE ABOVE INSTRUCTIONS WERE GIVEN TO THE PATIENT VERBALLY AND THEY VERBALIZED UNDERSTANDING.  THEY DO NOT REQUIRE ANY SPECIAL NEEDS AND DO NOT HAVE ANY LEARNING BARRIERS.          Directions for Reporting to Cardiology Waiting Area in the Hospital  If you park in the Parking Garage:  Take elevators to the1st floor of the parking garage.  Continue past the gift shop, coffee shop, and piano.  Take a right and go to the gold elevators. (Elevator B)  Take the elevator to the 3rd floor.  Follow the arrow on the sign on the wall that says Cath Lab Registration/EP Lab Registration.  Follow the long hallway all the way around until you come to a big open  area.  This is the registration area.  Check in at Reception Desk.    OR    If family is dropping you off:  Have them drop you off at the front of the Hospital under the green overhang.  Enter through the doors and take a right.  Take the E elevators to the 3rd floor Cardiology Waiting Area.  Check in at the Reception Desk in the waiting room.

## 2023-12-20 ENCOUNTER — ANESTHESIA EVENT (OUTPATIENT)
Dept: MEDSURG UNIT | Facility: HOSPITAL | Age: 68
DRG: 267 | End: 2023-12-20
Payer: COMMERCIAL

## 2023-12-20 NOTE — ANESTHESIA PREPROCEDURE EVALUATION
[facility]  Anesthesia Pre-Operative Evaluation         Patient Name: Asif Nolen  YOB: 1955  MRN: [id number]    SUBJECTIVE:     Pre-operative evaluation for Procedure(s) (LRB):  REPLACEMENT, AORTIC VALVE, TRANSCATHETER (TAVR) (N/A)     12/20/2023    Asif Nolen is a 68 y.o. male w/ a significant PMHx of CAD s/p CABG and AVR bioprosthetic aortic valve regurgitation--LVIDd= 6.1    Medical conditions include arthritis, HTN, DM2, vision loss, s/p CABG and AVR 8 years ago with Dr. Garcia, afib, alcoholic cirrhosis with esophageal varices s/p banding, thrombocytopenia.     Per CTS poor surgical candidate due to liver function and concern for hepatic decompensation        2D ECHO:  TTE:  Results for orders placed during the hospital encounter of 11/14/23    Echo Saline Bubble? No    Interpretation Summary    Left Ventricle: The left ventricle is mildly dilated. Normal wall thickness. There is severe eccentric hypertrophy. Normal wall motion. There is normal systolic function with a visually estimated ejection fraction of 65 - 70%. Grade II diastolic dysfunction.    Right Ventricle: Normal right ventricular cavity size. Wall thickness is normal. Right ventricle wall motion  is normal. Systolic function is normal.    Left Atrium: Left atrium is severely dilated.    Right Atrium: Right atrium is moderately dilated.    Aortic Valve: There is a homograft prosthetic valve in the aortic position. It is reported to be a 25 mm Popeye-Arevalo Perimount valve. There is mild aortic valve sclerosis. Mildly restricted motion. Aortic valve area by VTI is 1.39 cm². Aortic valve peak velocity is 3.24 m/s. Mean gradient is 26 mmHg. The dimensionless index is 0.42. There is severe aortic regurgitation.  It is a combination of valvular and paravalvular regurgitation with central one being predominant.  Please note that in the presence of severe AI transvalvular gradeitn may be overestimated.  There  is flow reversal in descending thoracic aorta.  By pressure half time LVEDP (and /42) is around 40mmHg    Mitral Valve: There is mild bileaflet sclerosis. There is mitral annular calcification present. There is mild regurgitation.    Tricuspid Valve: There is mild regurgitation.    Pulmonary Artery: The estimated pulmonary artery systolic pressure is 36 mmHg.    IVC/SVC: Normal venous pressure at 3 mmHg.      REESE:  Results for orders placed or performed during the hospital encounter of 07/19/23   Transesophageal echo (REESE)   Result Value Ref Range    BSA 1.91 m2    Left Ventricular Outflow Tract Mean Velocity 1.29 cm/s    Left Ventricular Outflow Tract Mean Gradient 8.00 mmHg    AV regurgitation pressure 1/2 time 315 ms    AV mean gradient 14 mmHg    AV Velocity Ratio 0.72     AV index (prosthetic) 0.72     LVOT peak akira 1.98 m/s    LVOT peak VTI 50.30 cm    Ao peak akira 2.75 m/s    Ao VTI 70.0 cm    AV peak gradient 30 mmHg    AR Max Akira 3.68 m/s    EF 75 %    Narrative    · REESE was performed to assess prosthetic aortic valve.  · There is a 25 mm Perimount bioprosthetic aortic valve present. The   bioprosthetic valve and the leaflets are not well visualized. Degenerative   changes noted.  · There is bioprosthetic aortic valve dysfunction with at least moderate,   very eccentric, posteriorly directed transvalvular regurgitation. PHT   between 280-315 ms. No aortic holodiastolic flow reversal.  · There is paravalvular regurgitation anteriorly and posteriorly. Unable   to perform accurate quantification due to anterior drop out artifact in   the posterior aspect of the aortic root. Probable moderate to severe.  · There is a 2-3 mm echolucent space in the posterior aspect of the aortic   root and the posterior aspect is not well visualized due to significant   drop out. Recommend cardiac CT to further evaluate for structural   abnormalities.  · There is no hemodynamic evidence of aortic stenosis with peak velocity    of 2.95 m/s, mean gradient 14 mmHg and AT <100 ms.  · The left ventricle is normal in size with hyperdynamic systolic   function.  · The estimated ejection fraction is 70-75%.              Patient Active Problem List   Diagnosis    Left posterior capsular opacification    Squamous blepharitis of upper and lower eyelids of both eyes    Distichiasis    Trichiasis of left lower eyelid    Recurrent erosion of left cornea    Left corneal scar    S/P eye surgery    Corneal scar with opacity    Eyelid cyst, left    Melena    Hx of CABG    H/O aortic valve replacement    Type 2 diabetes mellitus without complication, without long-term current use of insulin    Essential hypertension    Other hyperlipidemia    Peptic ulcer disease    Diarrhea of presumed infectious origin    Alcoholic cirrhosis of liver with ascites    Paroxysmal atrial fibrillation    Secondary esophageal varices without bleeding    Esophageal varices without bleeding    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    History of penetrating keratoplasty    Right posterior capsular opacification    Aortic prosthetic valve regurgitation       Review of patient's allergies indicates:  No Known Allergies    Current Medications:  Current Outpatient Medications   Medication Instructions    ascorbic acid (vitamin C) (VITAMIN C) 500 mg, Oral, Daily    benzonatate (TESSALON) 100 MG capsule SMARTSI Capsule(s) By Mouth Every 8 Hours PRN    calcium carbonate/vitamin D3 (CALCIUM 600 + D,3, ORAL) 1 tablet, Oral, Daily    cyanocobalamin (VITAMIN B-12) 5,000 mcg, Oral, Daily    cycloSPORINE (RESTASIS) 0.05 % ophthalmic emulsion 1 drop, Both Eyes, 2 times daily    doxycycline (VIBRAMYCIN) 100 mg, Oral, 2 times daily    ergocalciferol, vitamin D2, (VITAMIN D ORAL) 1 tablet, Oral, Daily    ezetimibe (ZETIA) 10 mg tablet No dose, route, or frequency recorded.    furosemide (LASIX) 40 mg, Oral, Daily    IRON, FERROUS SULFATE, ORAL 1 tablet, Oral, Every other day     metFORMIN (GLUCOPHAGE) 500 mg, Oral, With breakfast    omega-3 fatty acids/fish oil (FISH OIL-OMEGA-3 FATTY ACIDS) 300-1,000 mg capsule 1 capsule, Oral, Daily    omeprazole (PRILOSEC) 10 mg, Daily    omeprazole (PRILOSEC) 10 mg, Oral, Daily    prednisoLONE acetate (PRED FORTE) 1 % DrpS 1 drop, Left Eye, 4 times daily    promethazine-codeine 6.25-10 mg/5 ml (PHENERGAN WITH CODEINE) 6.25-10 mg/5 mL syrup 5-10 mLs, Oral, Every 4 hours PRN    promethazine-dextromethorphan (PROMETHAZINE-DM) 6.25-15 mg/5 mL Syrp 5 mLs, Oral, Every 6 hours PRN    spironolactone (ALDACTONE) 25 mg, Oral, Daily    tiZANidine (ZANAFLEX) 4 MG tablet SMARTSI Tablet(s) By Mouth Every 12 Hours PRN     Past Surgical History:   Procedure Laterality Date    CARDIAC SURGERY      CATARACT EXTRACTION Bilateral     CORNEAL TRANSPLANT Left 2021    L cubital tunnel release      open heart surg      PENETRATING KERATOPLASTY Left 3/29/2021    Procedure: KERATOPLASTY, PENETRATING;  Surgeon: Jerome Connors MD;  Location: [facility] OR;  Service: Ophthalmology;  Laterality: Left;    RETINAL DETACHMENT SURGERY      surgery keratectomy      TRANSESOPHAGEAL ECHOCARDIOGRAPHY N/A 2023    Procedure: ECHOCARDIOGRAM, TRANSESOPHAGEAL;  Surgeon: Jaswant Hercules MD;  [facility];  Service: Cardiology;  Laterality: N/A;     Social History     Substance and Sexual Activity   Drug Use Never     Alcohol Use: Unknown (2022)    AUDIT-C     Frequency of Alcohol Consumption: Not on file     Average Number of Drinks: Patient does not drink     Frequency of Binge Drinking: Not on file     Tobacco Use: Medium Risk (2023)    Patient History     Smoking Tobacco Use: Former     Smokeless Tobacco Use: Never     Passive Exposure: Not on file     OBJECTIVE:     Vital Signs Range (Last 24H):  BP: ()/()   Arterial Line BP: ()/()       Significant Labs:  Lab Results   Component Value Date    WBC 3.58 (L) 2023    HGB 9.6 (L) 2023    HCT  29.4 (L) 11/14/2023     (L) 11/14/2023    INR 1.0 11/14/2023       Lab Results   Component Value Date     (L) 11/14/2023     (L) 11/14/2023    K 4.8 11/14/2023    K 4.8 11/14/2023    CL 95 11/14/2023    CL 95 11/14/2023    CREATININE 1.0 11/14/2023    CREATININE 1.0 11/14/2023    BUN 16 11/14/2023    BUN 16 11/14/2023    CO2 25 11/14/2023    CO2 25 11/14/2023       Lab Results   Component Value Date    TSH 1.284 08/02/2023       EKG:   Results for orders placed or performed in visit on 08/02/23   IN OFFICE EKG 12-LEAD (to Pierce)    Collection Time: 08/02/23 12:57 PM    Narrative    Test Reason : H17.9,    Vent. Rate : 052 BPM     Atrial Rate : 052 BPM     P-R Int : 190 ms          QRS Dur : 088 ms      QT Int : 456 ms       P-R-T Axes : -15 -12 -09 degrees     QTc Int : 424 ms    Sinus bradycardia  Minimal voltage criteria for LVH, may be normal variant ( R in aVL )  Inferior infarct ,age undetermined  Abnormal ECG  When compared with ECG of 31-MAY-2023 14:04,  Inferior infarct is now Present  Confirmed by Liam Hutchison MD ([id number]) on 8/9/2023 8:11:41 PM    Referred By:             Confirmed By:Liam Hutchison MD       ASSESSMENT/PLAN:      Pre-op Assessment    I have reviewed the Patient Summary Reports.     I have reviewed the Nursing Notes. I have reviewed the NPO Status.   I have reviewed the Medications.     Review of Systems  Anesthesia Hx:               Denies Personal Hx of Anesthesia complications.                        Physical Exam  General: Well nourished, Cooperative, Alert and Oriented    Airway:  Mallampati: II   Neck ROM: Normal ROM    Dental:  Intact        Anesthesia Plan  Type of Anesthesia, risks & benefits discussed:    Anesthesia Type: MAC, Gen Natural Airway, Gen ETT, Gen Supraglottic Airway  Intra-op Monitoring Plan: Standard ASA Monitors and Art Line  Post Op Pain Control Plan: multimodal analgesia  Induction:  IV  ASA Score: 4  Day of Surgery Review of History & Physical:  H&P Update referred to the surgeon/provider.    Ready For Surgery From Anesthesia Perspective.     .

## 2023-12-21 ENCOUNTER — HOSPITAL ENCOUNTER (INPATIENT)
Facility: HOSPITAL | Age: 68
LOS: 1 days | Discharge: HOME | DRG: 267 | End: 2023-12-22
Attending: INTERNAL MEDICINE | Admitting: INTERNAL MEDICINE
Payer: COMMERCIAL

## 2023-12-21 ENCOUNTER — ANESTHESIA (OUTPATIENT)
Dept: MEDSURG UNIT | Facility: HOSPITAL | Age: 68
DRG: 267 | End: 2023-12-21
Payer: COMMERCIAL

## 2023-12-21 DIAGNOSIS — R00.1 BRADYCARDIA: ICD-10-CM

## 2023-12-21 DIAGNOSIS — R07.9 CHEST PAIN: ICD-10-CM

## 2023-12-21 DIAGNOSIS — I35.0 AORTIC VALVE STENOSIS, ETIOLOGY OF CARDIAC VALVE DISEASE UNSPECIFIED: ICD-10-CM

## 2023-12-21 DIAGNOSIS — Z95.2 H/O AORTIC VALVE REPLACEMENT: Primary | ICD-10-CM

## 2023-12-21 DIAGNOSIS — I45.4 IVCD (INTRAVENTRICULAR CONDUCTION DEFECT): ICD-10-CM

## 2023-12-21 DIAGNOSIS — Z95.3 STATUS POST TRANSCATHETER AORTIC VALVE REPLACEMENT (TAVR) USING BIOPROSTHESIS: ICD-10-CM

## 2023-12-21 DIAGNOSIS — I35.0 SEVERE AORTIC STENOSIS: ICD-10-CM

## 2023-12-21 DIAGNOSIS — Z98.890 S/P AORTIC VALVE REPAIR: ICD-10-CM

## 2023-12-21 DIAGNOSIS — I35.0 AORTIC STENOSIS, SEVERE: ICD-10-CM

## 2023-12-21 PROBLEM — I85.00 ESOPHAGEAL VARICES WITHOUT BLEEDING: Chronic | Status: ACTIVE | Noted: 2023-04-18

## 2023-12-21 LAB
ABO + RH BLD: NORMAL
ALBUMIN SERPL BCP-MCNC: 3.3 G/DL (ref 3.5–5.2)
ALBUMIN SERPL BCP-MCNC: 3.4 G/DL (ref 3.5–5.2)
ALP SERPL-CCNC: 100 U/L (ref 55–135)
ALP SERPL-CCNC: 99 U/L (ref 55–135)
ALT SERPL W/O P-5'-P-CCNC: 21 U/L (ref 10–44)
ALT SERPL W/O P-5'-P-CCNC: 23 U/L (ref 10–44)
ANION GAP SERPL CALC-SCNC: 6 MMOL/L (ref 8–16)
ANION GAP SERPL CALC-SCNC: 6 MMOL/L (ref 8–16)
APTT PPP: 28.5 SEC (ref 21–32)
AST SERPL-CCNC: 34 U/L (ref 10–40)
AST SERPL-CCNC: 35 U/L (ref 10–40)
BASOPHILS # BLD AUTO: 0.03 K/UL (ref 0–0.2)
BASOPHILS # BLD AUTO: 0.03 K/UL (ref 0–0.2)
BASOPHILS NFR BLD: 0.9 % (ref 0–1.9)
BASOPHILS NFR BLD: 1 % (ref 0–1.9)
BILIRUB SERPL-MCNC: 0.8 MG/DL (ref 0.1–1)
BILIRUB SERPL-MCNC: 1.2 MG/DL (ref 0.1–1)
BLD GP AB SCN CELLS X3 SERPL QL: NORMAL
BNP SERPL-MCNC: 343 PG/ML (ref 0–99)
BUN SERPL-MCNC: 18 MG/DL (ref 8–23)
BUN SERPL-MCNC: 21 MG/DL (ref 8–23)
CALCIUM SERPL-MCNC: 8.9 MG/DL (ref 8.7–10.5)
CALCIUM SERPL-MCNC: 9.2 MG/DL (ref 8.7–10.5)
CHLORIDE SERPL-SCNC: 101 MMOL/L (ref 95–110)
CHLORIDE SERPL-SCNC: 102 MMOL/L (ref 95–110)
CO2 SERPL-SCNC: 22 MMOL/L (ref 23–29)
CO2 SERPL-SCNC: 24 MMOL/L (ref 23–29)
CREAT SERPL-MCNC: 0.8 MG/DL (ref 0.5–1.4)
CREAT SERPL-MCNC: 0.9 MG/DL (ref 0.5–1.4)
DIFFERENTIAL METHOD: ABNORMAL
DIFFERENTIAL METHOD: ABNORMAL
EOSINOPHIL # BLD AUTO: 0.1 K/UL (ref 0–0.5)
EOSINOPHIL # BLD AUTO: 0.2 K/UL (ref 0–0.5)
EOSINOPHIL NFR BLD: 4.1 % (ref 0–8)
EOSINOPHIL NFR BLD: 5.7 % (ref 0–8)
ERYTHROCYTE [DISTWIDTH] IN BLOOD BY AUTOMATED COUNT: 14.6 % (ref 11.5–14.5)
ERYTHROCYTE [DISTWIDTH] IN BLOOD BY AUTOMATED COUNT: 14.6 % (ref 11.5–14.5)
EST. GFR  (NO RACE VARIABLE): >60 ML/MIN/1.73 M^2
EST. GFR  (NO RACE VARIABLE): >60 ML/MIN/1.73 M^2
GLUCOSE SERPL-MCNC: 125 MG/DL (ref 70–110)
GLUCOSE SERPL-MCNC: 140 MG/DL (ref 70–110)
HCT VFR BLD AUTO: 25.7 % (ref 40–54)
HCT VFR BLD AUTO: 25.7 % (ref 40–54)
HGB BLD-MCNC: 8.4 G/DL (ref 14–18)
HGB BLD-MCNC: 8.6 G/DL (ref 14–18)
IMM GRANULOCYTES # BLD AUTO: 0.01 K/UL (ref 0–0.04)
IMM GRANULOCYTES # BLD AUTO: 0.02 K/UL (ref 0–0.04)
IMM GRANULOCYTES NFR BLD AUTO: 0.3 % (ref 0–0.5)
IMM GRANULOCYTES NFR BLD AUTO: 0.6 % (ref 0–0.5)
INR PPP: 1.1 (ref 0.8–1.2)
INR PPP: 1.1 (ref 0.8–1.2)
LYMPHOCYTES # BLD AUTO: 0.3 K/UL (ref 1–4.8)
LYMPHOCYTES # BLD AUTO: 0.5 K/UL (ref 1–4.8)
LYMPHOCYTES NFR BLD: 10.7 % (ref 18–48)
LYMPHOCYTES NFR BLD: 15.2 % (ref 18–48)
MAGNESIUM SERPL-MCNC: 1.8 MG/DL (ref 1.6–2.6)
MCH RBC QN AUTO: 26.4 PG (ref 27–31)
MCH RBC QN AUTO: 26.6 PG (ref 27–31)
MCHC RBC AUTO-ENTMCNC: 32.7 G/DL (ref 32–36)
MCHC RBC AUTO-ENTMCNC: 33.5 G/DL (ref 32–36)
MCV RBC AUTO: 79 FL (ref 82–98)
MCV RBC AUTO: 81 FL (ref 82–98)
MONOCYTES # BLD AUTO: 0.3 K/UL (ref 0.3–1)
MONOCYTES # BLD AUTO: 0.4 K/UL (ref 0.3–1)
MONOCYTES NFR BLD: 10.1 % (ref 4–15)
MONOCYTES NFR BLD: 11.7 % (ref 4–15)
NEUTROPHILS # BLD AUTO: 2.1 K/UL (ref 1.8–7.7)
NEUTROPHILS # BLD AUTO: 2.3 K/UL (ref 1.8–7.7)
NEUTROPHILS NFR BLD: 65.8 % (ref 38–73)
NEUTROPHILS NFR BLD: 73.9 % (ref 38–73)
NRBC BLD-RTO: 0 /100 WBC
NRBC BLD-RTO: 0 /100 WBC
PHOSPHATE SERPL-MCNC: 3 MG/DL (ref 2.7–4.5)
PLATELET # BLD AUTO: 80 K/UL (ref 150–450)
PLATELET # BLD AUTO: 97 K/UL (ref 150–450)
PMV BLD AUTO: 9.5 FL (ref 9.2–12.9)
PMV BLD AUTO: 9.7 FL (ref 9.2–12.9)
POCT GLUCOSE: 142 MG/DL (ref 70–110)
POCT GLUCOSE: 149 MG/DL (ref 70–110)
POCT GLUCOSE: 149 MG/DL (ref 70–110)
POTASSIUM SERPL-SCNC: 4.5 MMOL/L (ref 3.5–5.1)
POTASSIUM SERPL-SCNC: 4.6 MMOL/L (ref 3.5–5.1)
PROT SERPL-MCNC: 6.7 G/DL (ref 6–8.4)
PROT SERPL-MCNC: 7.2 G/DL (ref 6–8.4)
PROTHROMBIN TIME: 11.5 SEC (ref 9–12.5)
PROTHROMBIN TIME: 12 SEC (ref 9–12.5)
RBC # BLD AUTO: 3.16 M/UL (ref 4.6–6.2)
RBC # BLD AUTO: 3.26 M/UL (ref 4.6–6.2)
SODIUM SERPL-SCNC: 130 MMOL/L (ref 136–145)
SODIUM SERPL-SCNC: 131 MMOL/L (ref 136–145)
WBC # BLD AUTO: 3.15 K/UL (ref 3.9–12.7)
WBC # BLD AUTO: 3.17 K/UL (ref 3.9–12.7)

## 2023-12-21 PROCEDURE — 25000003 PHARM REV CODE 250: Performed by: INTERNAL MEDICINE

## 2023-12-21 PROCEDURE — 93010 ELECTROCARDIOGRAM REPORT: CPT | Mod: ,,, | Performed by: INTERNAL MEDICINE

## 2023-12-21 PROCEDURE — C1894 INTRO/SHEATH, NON-LASER: HCPCS | Performed by: INTERNAL MEDICINE

## 2023-12-21 PROCEDURE — 37000008 HC ANESTHESIA 1ST 15 MINUTES: Performed by: INTERNAL MEDICINE

## 2023-12-21 PROCEDURE — 63600175 PHARM REV CODE 636 W HCPCS: Performed by: STUDENT IN AN ORGANIZED HEALTH CARE EDUCATION/TRAINING PROGRAM

## 2023-12-21 PROCEDURE — 93010 EKG 12-LEAD: ICD-10-PCS | Mod: ,,, | Performed by: INTERNAL MEDICINE

## 2023-12-21 PROCEDURE — C1769 GUIDE WIRE: HCPCS | Performed by: INTERNAL MEDICINE

## 2023-12-21 PROCEDURE — D9220A PRA ANESTHESIA: Mod: ,,, | Performed by: ANESTHESIOLOGY

## 2023-12-21 PROCEDURE — 93005 ELECTROCARDIOGRAM TRACING: CPT

## 2023-12-21 PROCEDURE — 25500020 PHARM REV CODE 255: Performed by: INTERNAL MEDICINE

## 2023-12-21 PROCEDURE — 25000003 PHARM REV CODE 250: Performed by: STUDENT IN AN ORGANIZED HEALTH CARE EDUCATION/TRAINING PROGRAM

## 2023-12-21 PROCEDURE — 36620 INSERTION CATHETER ARTERY: CPT | Mod: 59,,, | Performed by: ANESTHESIOLOGY

## 2023-12-21 PROCEDURE — 36620 ARTERIAL: ICD-10-PCS | Mod: 59,,, | Performed by: ANESTHESIOLOGY

## 2023-12-21 PROCEDURE — 85025 COMPLETE CBC W/AUTO DIFF WBC: CPT | Performed by: INTERNAL MEDICINE

## 2023-12-21 PROCEDURE — 27800903 OPTIME MED/SURG SUP & DEVICES OTHER IMPLANTS: Performed by: INTERNAL MEDICINE

## 2023-12-21 PROCEDURE — 36556 INSERT NON-TUNNEL CV CATH: CPT | Mod: 59,,, | Performed by: ANESTHESIOLOGY

## 2023-12-21 PROCEDURE — 25000003 PHARM REV CODE 250

## 2023-12-21 PROCEDURE — 82962 GLUCOSE BLOOD TEST: CPT | Performed by: INTERNAL MEDICINE

## 2023-12-21 PROCEDURE — 33361 REPLACE AORTIC VALVE PERQ: CPT | Mod: 62,Q0,, | Performed by: INTERNAL MEDICINE

## 2023-12-21 PROCEDURE — 80053 COMPREHEN METABOLIC PANEL: CPT | Performed by: INTERNAL MEDICINE

## 2023-12-21 PROCEDURE — 86850 RBC ANTIBODY SCREEN: CPT | Performed by: INTERNAL MEDICINE

## 2023-12-21 PROCEDURE — C1779 LEAD, PMKR, TRANSVENOUS VDD: HCPCS | Performed by: INTERNAL MEDICINE

## 2023-12-21 PROCEDURE — 33361 REPLACE AORTIC VALVE PERQ: CPT | Mod: 62,,, | Performed by: THORACIC SURGERY (CARDIOTHORACIC VASCULAR SURGERY)

## 2023-12-21 PROCEDURE — 94761 N-INVAS EAR/PLS OXIMETRY MLT: CPT

## 2023-12-21 PROCEDURE — 33361 PR TAVR, PERCUTANEOUS FEMORAL: ICD-10-PCS | Mod: 62,,, | Performed by: THORACIC SURGERY (CARDIOTHORACIC VASCULAR SURGERY)

## 2023-12-21 PROCEDURE — 83735 ASSAY OF MAGNESIUM: CPT

## 2023-12-21 PROCEDURE — 85610 PROTHROMBIN TIME: CPT | Performed by: INTERNAL MEDICINE

## 2023-12-21 PROCEDURE — C1725 CATH, TRANSLUMIN NON-LASER: HCPCS | Performed by: INTERNAL MEDICINE

## 2023-12-21 PROCEDURE — 85025 COMPLETE CBC W/AUTO DIFF WBC: CPT | Mod: 91

## 2023-12-21 PROCEDURE — 85730 THROMBOPLASTIN TIME PARTIAL: CPT | Performed by: STUDENT IN AN ORGANIZED HEALTH CARE EDUCATION/TRAINING PROGRAM

## 2023-12-21 PROCEDURE — 80053 COMPREHEN METABOLIC PANEL: CPT | Mod: 91

## 2023-12-21 PROCEDURE — 85610 PROTHROMBIN TIME: CPT | Mod: 91 | Performed by: STUDENT IN AN ORGANIZED HEALTH CARE EDUCATION/TRAINING PROGRAM

## 2023-12-21 PROCEDURE — 27201423 OPTIME MED/SURG SUP & DEVICES STERILE SUPPLY: Performed by: INTERNAL MEDICINE

## 2023-12-21 PROCEDURE — 36556 CENTRAL LINE: ICD-10-PCS | Mod: 59,,, | Performed by: ANESTHESIOLOGY

## 2023-12-21 PROCEDURE — 33361 REPLACE AORTIC VALVE PERQ: CPT | Mod: Q0 | Performed by: INTERNAL MEDICINE

## 2023-12-21 PROCEDURE — D9220A PRA ANESTHESIA: ICD-10-PCS | Mod: ,,, | Performed by: ANESTHESIOLOGY

## 2023-12-21 PROCEDURE — 20600001 HC STEP DOWN PRIVATE ROOM

## 2023-12-21 PROCEDURE — 83880 ASSAY OF NATRIURETIC PEPTIDE: CPT | Performed by: INTERNAL MEDICINE

## 2023-12-21 PROCEDURE — 37000009 HC ANESTHESIA EA ADD 15 MINS: Performed by: INTERNAL MEDICINE

## 2023-12-21 PROCEDURE — 99233 SBSQ HOSP IP/OBS HIGH 50: CPT | Mod: ,,, | Performed by: INTERNAL MEDICINE

## 2023-12-21 PROCEDURE — 84100 ASSAY OF PHOSPHORUS: CPT

## 2023-12-21 PROCEDURE — 33361 PR TAVR, PERCUTANEOUS FEMORAL: ICD-10-PCS | Mod: 62,Q0,, | Performed by: INTERNAL MEDICINE

## 2023-12-21 PROCEDURE — 99233 PR SUBSEQUENT HOSPITAL CARE,LEVL III: ICD-10-PCS | Mod: 25,,, | Performed by: INTERNAL MEDICINE

## 2023-12-21 PROCEDURE — C1760 CLOSURE DEV, VASC: HCPCS | Performed by: INTERNAL MEDICINE

## 2023-12-21 PROCEDURE — 99233 PR SUBSEQUENT HOSPITAL CARE,LEVL III: ICD-10-PCS | Mod: ,,, | Performed by: INTERNAL MEDICINE

## 2023-12-21 PROCEDURE — 63600175 PHARM REV CODE 636 W HCPCS

## 2023-12-21 PROCEDURE — 99233 SBSQ HOSP IP/OBS HIGH 50: CPT | Mod: 25,,, | Performed by: INTERNAL MEDICINE

## 2023-12-21 DEVICE — SAPIEN 3 ULTRA VALVE WITH COMMANDER DELIVERY SYSTEM
Type: IMPLANTABLE DEVICE | Site: HEART | Status: FUNCTIONAL
Brand: SAPIEN 3 ULTRA SYSTEM

## 2023-12-21 RX ORDER — HEPARIN SODIUM,PORCINE/D5W 25000/250
0-40 INTRAVENOUS SOLUTION INTRAVENOUS CONTINUOUS
Status: DISCONTINUED | OUTPATIENT
Start: 2023-12-21 | End: 2023-12-22 | Stop reason: HOSPADM

## 2023-12-21 RX ORDER — SODIUM CHLORIDE 0.9 % (FLUSH) 0.9 %
10 SYRINGE (ML) INJECTION
Status: DISCONTINUED | OUTPATIENT
Start: 2023-12-21 | End: 2023-12-22 | Stop reason: HOSPADM

## 2023-12-21 RX ORDER — ACETAMINOPHEN 325 MG/1
650 TABLET ORAL EVERY 4 HOURS PRN
Status: DISCONTINUED | OUTPATIENT
Start: 2023-12-21 | End: 2023-12-22 | Stop reason: HOSPADM

## 2023-12-21 RX ORDER — HEPARIN SOD,PORCINE/0.9 % NACL 1000/500ML
INTRAVENOUS SOLUTION INTRAVENOUS
Status: DISCONTINUED | OUTPATIENT
Start: 2023-12-21 | End: 2023-12-22 | Stop reason: HOSPADM

## 2023-12-21 RX ORDER — ACETAMINOPHEN 325 MG/1
650 TABLET ORAL EVERY 4 HOURS PRN
Status: CANCELLED | OUTPATIENT
Start: 2023-12-21

## 2023-12-21 RX ORDER — SODIUM CHLORIDE 9 MG/ML
INJECTION, SOLUTION INTRAVENOUS CONTINUOUS
Status: ACTIVE | OUTPATIENT
Start: 2023-12-21 | End: 2023-12-21

## 2023-12-21 RX ORDER — MORPHINE SULFATE 2 MG/ML
2 INJECTION, SOLUTION INTRAMUSCULAR; INTRAVENOUS EVERY 4 HOURS PRN
Status: DISCONTINUED | OUTPATIENT
Start: 2023-12-21 | End: 2023-12-22 | Stop reason: HOSPADM

## 2023-12-21 RX ORDER — LACTULOSE 10 G/15ML
10 SOLUTION ORAL 2 TIMES DAILY
Status: DISCONTINUED | OUTPATIENT
Start: 2023-12-21 | End: 2023-12-22 | Stop reason: HOSPADM

## 2023-12-21 RX ORDER — SODIUM CHLORIDE 9 MG/ML
INJECTION, SOLUTION INTRAVENOUS CONTINUOUS
Status: ACTIVE | OUTPATIENT
Start: 2023-12-21

## 2023-12-21 RX ORDER — SPIRONOLACTONE 25 MG/1
25 TABLET ORAL DAILY
Status: DISCONTINUED | OUTPATIENT
Start: 2023-12-22 | End: 2023-12-22 | Stop reason: HOSPADM

## 2023-12-21 RX ORDER — ONDANSETRON 8 MG/1
8 TABLET, ORALLY DISINTEGRATING ORAL EVERY 8 HOURS PRN
Status: DISCONTINUED | OUTPATIENT
Start: 2023-12-21 | End: 2023-12-22 | Stop reason: HOSPADM

## 2023-12-21 RX ORDER — TALC
6 POWDER (GRAM) TOPICAL NIGHTLY PRN
Status: CANCELLED | OUTPATIENT
Start: 2023-12-21

## 2023-12-21 RX ORDER — ASPIRIN 325 MG
325 TABLET ORAL ONCE
Status: COMPLETED | OUTPATIENT
Start: 2023-12-21 | End: 2023-12-21

## 2023-12-21 RX ORDER — ONDANSETRON 8 MG/1
8 TABLET, ORALLY DISINTEGRATING ORAL EVERY 8 HOURS PRN
Status: CANCELLED | OUTPATIENT
Start: 2023-12-21

## 2023-12-21 RX ORDER — AMOXICILLIN 250 MG
1 CAPSULE ORAL 2 TIMES DAILY
Status: DISCONTINUED | OUTPATIENT
Start: 2023-12-21 | End: 2023-12-22 | Stop reason: HOSPADM

## 2023-12-21 RX ORDER — FUROSEMIDE 40 MG/1
40 TABLET ORAL DAILY
Status: DISCONTINUED | OUTPATIENT
Start: 2023-12-22 | End: 2023-12-22 | Stop reason: HOSPADM

## 2023-12-21 RX ORDER — ENOXAPARIN SODIUM 100 MG/ML
40 INJECTION SUBCUTANEOUS EVERY 24 HOURS
Status: CANCELLED | OUTPATIENT
Start: 2023-12-21

## 2023-12-21 RX ORDER — TALC
6 POWDER (GRAM) TOPICAL NIGHTLY PRN
Status: DISCONTINUED | OUTPATIENT
Start: 2023-12-21 | End: 2023-12-22 | Stop reason: HOSPADM

## 2023-12-21 RX ORDER — NAPROXEN SODIUM 220 MG/1
81 TABLET, FILM COATED ORAL DAILY
Status: DISCONTINUED | OUTPATIENT
Start: 2023-12-22 | End: 2023-12-22 | Stop reason: HOSPADM

## 2023-12-21 RX ORDER — PROTAMINE SULFATE 10 MG/ML
INJECTION, SOLUTION INTRAVENOUS
Status: DISCONTINUED | OUTPATIENT
Start: 2023-12-21 | End: 2023-12-21

## 2023-12-21 RX ORDER — ENOXAPARIN SODIUM 100 MG/ML
40 INJECTION SUBCUTANEOUS EVERY 24 HOURS
Status: DISCONTINUED | OUTPATIENT
Start: 2023-12-22 | End: 2023-12-21

## 2023-12-21 RX ORDER — NOREPINEPHRINE BITARTRATE 1 MG/ML
INJECTION, SOLUTION INTRAVENOUS
Status: DISCONTINUED | OUTPATIENT
Start: 2023-12-21 | End: 2023-12-21

## 2023-12-21 RX ORDER — CEFAZOLIN SODIUM 1 G/3ML
INJECTION, POWDER, FOR SOLUTION INTRAMUSCULAR; INTRAVENOUS
Status: DISCONTINUED | OUTPATIENT
Start: 2023-12-21 | End: 2023-12-21

## 2023-12-21 RX ORDER — SODIUM CHLORIDE 9 MG/ML
INJECTION, SOLUTION INTRAVENOUS CONTINUOUS
Status: DISCONTINUED | OUTPATIENT
Start: 2023-12-21 | End: 2023-12-22 | Stop reason: HOSPADM

## 2023-12-21 RX ORDER — PANTOPRAZOLE SODIUM 40 MG/1
40 TABLET, DELAYED RELEASE ORAL DAILY
Status: DISCONTINUED | OUTPATIENT
Start: 2023-12-22 | End: 2023-12-22 | Stop reason: HOSPADM

## 2023-12-21 RX ORDER — MORPHINE SULFATE 2 MG/ML
2 INJECTION, SOLUTION INTRAMUSCULAR; INTRAVENOUS EVERY 4 HOURS PRN
Status: CANCELLED | OUTPATIENT
Start: 2023-12-21

## 2023-12-21 RX ORDER — ACETAMINOPHEN 325 MG/1
650 TABLET ORAL EVERY 4 HOURS PRN
Status: DISCONTINUED | OUTPATIENT
Start: 2023-12-21 | End: 2023-12-21

## 2023-12-21 RX ORDER — HEPARIN SODIUM 1000 [USP'U]/ML
INJECTION, SOLUTION INTRAVENOUS; SUBCUTANEOUS
Status: DISCONTINUED | OUTPATIENT
Start: 2023-12-21 | End: 2023-12-21

## 2023-12-21 RX ORDER — DIPHENHYDRAMINE HCL 50 MG
50 CAPSULE ORAL ONCE
Status: COMPLETED | OUTPATIENT
Start: 2023-12-21 | End: 2023-12-21

## 2023-12-21 RX ORDER — FENTANYL CITRATE 50 UG/ML
INJECTION, SOLUTION INTRAMUSCULAR; INTRAVENOUS
Status: DISCONTINUED | OUTPATIENT
Start: 2023-12-21 | End: 2023-12-21

## 2023-12-21 RX ORDER — POLYETHYLENE GLYCOL 3350 17 G/17G
17 POWDER, FOR SOLUTION ORAL DAILY
Status: DISCONTINUED | OUTPATIENT
Start: 2023-12-22 | End: 2023-12-22 | Stop reason: HOSPADM

## 2023-12-21 RX ORDER — SPIRONOLACTONE 25 MG/1
25 TABLET ORAL DAILY
Status: DISCONTINUED | OUTPATIENT
Start: 2023-12-22 | End: 2023-12-21

## 2023-12-21 RX ORDER — SODIUM CHLORIDE 0.9 % (FLUSH) 0.9 %
10 SYRINGE (ML) INJECTION
Status: CANCELLED | OUTPATIENT
Start: 2023-12-21

## 2023-12-21 RX ORDER — LACTULOSE 10 G/15ML
10 SOLUTION ORAL; RECTAL 2 TIMES DAILY
COMMUNITY
End: 2024-01-19 | Stop reason: SDUPTHER

## 2023-12-21 RX ORDER — PROPOFOL 10 MG/ML
VIAL (ML) INTRAVENOUS
Status: DISCONTINUED | OUTPATIENT
Start: 2023-12-21 | End: 2023-12-21

## 2023-12-21 RX ORDER — NOREPINEPHRINE BITARTRATE/D5W 4MG/250ML
0-3 PLASTIC BAG, INJECTION (ML) INTRAVENOUS CONTINUOUS
Status: DISCONTINUED | OUTPATIENT
Start: 2023-12-21 | End: 2023-12-21

## 2023-12-21 RX ORDER — ONDANSETRON 8 MG/1
8 TABLET, ORALLY DISINTEGRATING ORAL EVERY 8 HOURS PRN
Status: DISCONTINUED | OUTPATIENT
Start: 2023-12-21 | End: 2023-12-21

## 2023-12-21 RX ORDER — LIDOCAINE HYDROCHLORIDE 20 MG/ML
INJECTION, SOLUTION EPIDURAL; INFILTRATION; INTRACAUDAL; PERINEURAL
Status: DISCONTINUED | OUTPATIENT
Start: 2023-12-21 | End: 2023-12-22 | Stop reason: HOSPADM

## 2023-12-21 RX ADMIN — NOREPINEPHRINE BITARTRATE 5 MCG: 1 INJECTION, SOLUTION, CONCENTRATE INTRAVENOUS at 09:12

## 2023-12-21 RX ADMIN — ACETAMINOPHEN 650 MG: 325 TABLET ORAL at 06:12

## 2023-12-21 RX ADMIN — HEPARIN SODIUM 9000 UNITS: 1000 INJECTION, SOLUTION INTRAVENOUS; SUBCUTANEOUS at 09:12

## 2023-12-21 RX ADMIN — GLYCOPYRROLATE 0.2 MG: 0.2 INJECTION INTRAMUSCULAR; INTRAVENOUS at 08:12

## 2023-12-21 RX ADMIN — PROPOFOL 20 MG: 10 INJECTION, EMULSION INTRAVENOUS at 08:12

## 2023-12-21 RX ADMIN — ASPIRIN 325 MG ORAL TABLET 325 MG: 325 PILL ORAL at 07:12

## 2023-12-21 RX ADMIN — SODIUM CHLORIDE: 9 INJECTION, SOLUTION INTRAVENOUS at 10:12

## 2023-12-21 RX ADMIN — CEFAZOLIN 2 G: 330 INJECTION, POWDER, FOR SOLUTION INTRAMUSCULAR; INTRAVENOUS at 08:12

## 2023-12-21 RX ADMIN — NOREPINEPHRINE BITARTRATE 8 MCG: 1 INJECTION, SOLUTION, CONCENTRATE INTRAVENOUS at 10:12

## 2023-12-21 RX ADMIN — FENTANYL CITRATE 50 MCG: 50 INJECTION INTRAMUSCULAR; INTRAVENOUS at 08:12

## 2023-12-21 RX ADMIN — EPINEPHRINE 0.04 MCG/KG/MIN: 1 INJECTION INTRAMUSCULAR; INTRAVENOUS; SUBCUTANEOUS at 08:12

## 2023-12-21 RX ADMIN — HEPARIN SODIUM AND DEXTROSE 12 UNITS/KG/HR: 10000; 5 INJECTION INTRAVENOUS at 06:12

## 2023-12-21 RX ADMIN — MORPHINE SULFATE 2 MG: 2 INJECTION, SOLUTION INTRAMUSCULAR; INTRAVENOUS at 04:12

## 2023-12-21 RX ADMIN — LACTULOSE 10 G: 20 SOLUTION ORAL at 08:12

## 2023-12-21 RX ADMIN — PROTAMINE SULFATE 90 MG: 10 INJECTION, SOLUTION INTRAVENOUS at 09:12

## 2023-12-21 RX ADMIN — GLYCOPYRROLATE 0.2 MG: 0.2 INJECTION INTRAMUSCULAR; INTRAVENOUS at 10:12

## 2023-12-21 RX ADMIN — PROPOFOL 20 MG: 10 INJECTION, EMULSION INTRAVENOUS at 09:12

## 2023-12-21 RX ADMIN — DIPHENHYDRAMINE HYDROCHLORIDE 50 MG: 50 CAPSULE ORAL at 07:12

## 2023-12-21 RX ADMIN — LIDOCAINE HYDROCHLORIDE 100 ML: 20 INJECTION, SOLUTION EPIDURAL; INFILTRATION; INTRACAUDAL; PERINEURAL at 08:12

## 2023-12-21 NOTE — H&P
[facility] - Short Stay Cardiac Unit  Interventional Cardiology  H&P    Patient Name: Asif Nolen  MRN: [id number]  Admission Date: 12/21/2023  Code Status: Prior   Attending Provider: Reinier Haley MD   Primary Care Physician: No, Primary Doctor  Principal Problem:<principal problem not specified>    Patient information was obtained from patient and ER records.     Subjective:     HPI:  67 y.o. with hx of DM, HTN, CAD, s/p CABG ( LIMA to LAD) and AVR with  25 mm Perimount valve 2016 at  , liver cirrhosis with grade 4-5 varices, recent banding in Jan 2023, hx of retinal detachment.     Pt was noted to have degeneration of bioprosthetic valve at [place], with stenosis, TAVR aborted due to low intraprocedural gradient across the aortic valve. Pt here for second opinion.     Pt reports occasional BARNARD, but can walk 1/2 block, no c/p no LE edema, no syncope, no palpitations     Asif Nolen is a 67 y.o. male referred by Dr Granados for evaluation of failing bioprosthetic valve with severe AR (NYHA Class III sx).     The patient has undergone the following TAVR work-up:   ECHO (Date 11/14/2023): AYAH= 1.39 cm2, MG= 26 mmHg, Peak Akira= 3.24 m/s, EF= 65%. DI: 0.42  LHC (Date ): non-obstructive coronary disease (Need to get records from OSH)   STS: 4%   Frailty: 1/4   Iliacs are >7 on R and > 6.5 on L   LVOT area by CTA is 3.76 cm2 (26.6 mm X 20.2 mm) and Avg Diameter is 21.9 per Dr Haley  Incidental findings on CT: Cirrhotic liver morphology. Sequela of portal venous hypertension including splenomegaly and upper abdominal venous collateral vessel formation. Avascular necrosis of the femoral heads.  CT Surgery risk assessment: High risk, per Dr Galan due to esophageal varices, redo sternotomy, thrombocytopenia  Rhythm issues: Afib  PFTs: deferred   KCCQ/5 meter walk: 3.9  Comorbidities: Afib, liver cirrhosis, prior sternotomy, HTN       Past Medical History:   Diagnosis Date    Arthritis     Cataract     Coronary  artery disease     Digestive disorder     Encounter for blood transfusion 04/13/2023    Heart disease     Hypertension     Type 2 diabetes mellitus without complication, without long-term current use of insulin 12/30/2022    Vision loss        Past Surgical History:   Procedure Laterality Date    CARDIAC SURGERY      CATARACT EXTRACTION Bilateral     CORNEAL TRANSPLANT Left 03/29/2021    L cubital tunnel release      open heart surg      PENETRATING KERATOPLASTY Left 3/29/2021    Procedure: KERATOPLASTY, PENETRATING;  Surgeon: Jerome Connors MD;  Location: [facility] OR;  Service: Ophthalmology;  Laterality: Left;    RETINAL DETACHMENT SURGERY      surgery keratectomy      TRANSESOPHAGEAL ECHOCARDIOGRAPHY N/A 7/19/2023    Procedure: ECHOCARDIOGRAM, TRANSESOPHAGEAL;  Surgeon: Jaswant Hercules MD;  Location: [facility] CATH LAB;  Service: Cardiology;  Laterality: N/A;       Review of patient's allergies indicates:  No Known Allergies    PTA Medications   Medication Sig    ascorbic acid, vitamin C, (VITAMIN C) 500 MG tablet Take 500 mg by mouth once daily.    cyanocobalamin (VITAMIN B-12) 1000 MCG tablet Take 5,000 mcg by mouth once daily.    ergocalciferol, vitamin D2, (VITAMIN D ORAL) Take 1 tablet by mouth once daily.    lactulose (CHRONULAC) 10 gram/15 mL solution Take 10 g by mouth 2 (two) times daily.    omega-3 fatty acids/fish oil (FISH OIL-OMEGA-3 FATTY ACIDS) 300-1,000 mg capsule Take 1 capsule by mouth once daily.    omeprazole (PRILOSEC) 10 MG capsule Take 10 mg by mouth once daily.    spironolactone (ALDACTONE) 25 MG tablet Take 1 tablet (25 mg total) by mouth once daily.    calcium carbonate/vitamin D3 (CALCIUM 600 + D,3, ORAL) Take 1 tablet by mouth Daily.    furosemide (LASIX) 40 MG tablet Take 1 tablet (40 mg total) by mouth once daily.    prednisoLONE acetate (PRED FORTE) 1 % DrpS Place 1 drop into the left eye 4 (four) times daily.     Family History       Problem Relation (Age of Onset)    Heart disease  Father    No Known Problems Mother          Tobacco Use    Smoking status: Former     Types: Cigarettes    Smokeless tobacco: Never    Tobacco comments:     Quit smoking 17      years ago   Substance and Sexual Activity    Alcohol use: Not Currently    Drug use: Never    Sexual activity: Yes     Partners: Female     Review of Systems   Constitutional: Negative for fever and malaise/fatigue.   Eyes:  Negative for blurred vision and pain.   Cardiovascular:  Negative for chest pain, dyspnea on exertion, leg swelling, orthopnea, palpitations and paroxysmal nocturnal dyspnea.   Respiratory:  Negative for cough, shortness of breath, sputum production and wheezing.    Hematologic/Lymphatic: Negative for adenopathy and bleeding problem.   Skin:  Negative for rash.   Musculoskeletal:  Negative for back pain and neck pain.   Gastrointestinal:  Negative for abdominal pain, constipation, diarrhea, nausea and vomiting.   Genitourinary:  Negative for dysuria.   Neurological:  Negative for dizziness, headaches, light-headedness and weakness.     Objective:     Vital Signs (Most Recent):  Temp: 98.6 °F (37 °C) (12/21/23 0609)  Pulse: (!) 59 (12/21/23 0609)  Resp: 18 (12/21/23 0609)  BP: (!) 124/58 (12/21/23 0609)  SpO2: 97 % (12/21/23 0609) Vital Signs (24h Range):  Temp:  [98.6 °F (37 °C)] 98.6 °F (37 °C)  Pulse:  [59] 59  Resp:  [18] 18  SpO2:  [97 %] 97 %  BP: (124-138)/(58-62) 124/58     Weight: 76.2 kg (168 lb)  Body mass index is 24.11 kg/m².    SpO2: 97 %       No intake or output data in the 24 hours ending 12/21/23 0712    Lines/Drains/Airways       Central Venous Catheter Line  Duration             Introducer 12/21/23 0643 <1 day              Arterial Line  Duration             Arterial Line 12/21/23 0643 <1 day              Peripheral Intravenous Line  Duration                  Peripheral IV - Single Lumen 12/21/23 0631 20 G Left;Posterior Forearm <1 day                     Physical Exam  Constitutional:       General:  "He is not in acute distress.     Appearance: Normal appearance. He is not ill-appearing, toxic-appearing or diaphoretic.   HENT:      Head: Normocephalic and atraumatic.      Nose: Nose normal.   Eyes:      Extraocular Movements: Extraocular movements intact.      Pupils: Pupils are equal, round, and reactive to light.   Cardiovascular:      Rate and Rhythm: Normal rate and regular rhythm.      Heart sounds:      No friction rub. No gallop.   Pulmonary:      Effort: Pulmonary effort is normal. No respiratory distress.      Breath sounds: Normal breath sounds. No wheezing or rales.   Abdominal:      General: Abdomen is flat. There is no distension.      Palpations: Abdomen is soft. There is no mass.      Tenderness: There is no abdominal tenderness.   Musculoskeletal:         General: No swelling. Normal range of motion.      Cervical back: Normal range of motion. No rigidity.      Right lower leg: No edema.      Left lower leg: No edema.   Skin:     General: Skin is warm and dry.      Coloration: Skin is not jaundiced.      Findings: No bruising.   Neurological:      General: No focal deficit present.      Mental Status: He is alert and oriented to person, place, and time.      Cranial Nerves: No cranial nerve deficit.      Motor: No weakness.            Significant Labs: BMP: No results for input(s): "GLU", "NA", "K", "CL", "CO2", "BUN", "CREATININE", "CALCIUM", "MG" in the last 48 hours. and CBC   Recent Labs   Lab 12/21/23  0625   WBC 3.15*   HGB 8.6*   HCT 25.7*   PLT 97*       Significant Imaging: Reviewed   Assessment and Plan:     Cardiac/Vascular  Aortic stenosis  Transcatheter Aortic valve replacement   Valve: 26 mm Medtronic Evolute valve   ECMO:  On Stand by  TAVR access:  RTFA access   Valvuloplasty balloon: TBD   Viabahn size if needed: TBD  Antithrombotic therapy: Aspirin   Temporary pacing wire: No, Will pace from TAVR wire  Pacemaker risk factors: Sinus bradycardia    Post op destination: " ICU  Antibiotics given: (to be done during procedure)  Heart failure on admission? No   CONSENTING:  The patient was told that the procedure carries around a 12.5% risk of permanent pacemaker requirement and that risk depends on the patients underlying conduction system.  Prior to the [facility] visit, all available records from referring provider were reviewed.  I have personally reviewed all the lab tests available related to the patient's case  The patient's images were reviewed by myself and the procedural planning was done with my own interpretation of the iliac and aortic anatomy based on CTA, angiography or REESE. I have reviewed all other imaging studies relevant to the patient including echocardiography and coronary angiography.  Patient was educated abut the pathophysiology and natural history of severe aortic stenosis and was educated about the treatment options including surgical and transcatheter valve replacement. She agrees to be full code for the forseable future and to undergo workup for treatment of severe AS.   The risks, benefits, and alternatives of transcatheter aortic valve replacement were discussed with the patient. All questions were answered and informed consent was obtained. I had a detailed discussion with the patient regarding risk of stroke, MI, bleeding access site complications including limb loss, allergy, kidney failure including dialysis and death.  The patient understands the risks and benefits and wishes to go ahead with the procedure.  The referring Cardiologist was notified of the plan  All patient's questions were answered    Shared Decision Making:  This patient was seen today by a multidisciplinary heart team involving both a Structural Heart Specialist (Interventional Cardiologist) and a Cardiothoracic Surgeon. The patient was involved in shared decision-making to define clearer goals for treatment and align health decisions with their current values.  The patient understands the  risks and expected benefits of their treatment options.              VTE Risk Mitigation (From admission, onward)      None              Dylon Wilson MD  Interventional Cardiology   [facility] - Short Stay Cardiac Unit

## 2023-12-21 NOTE — SUBJECTIVE & OBJECTIVE
Past Medical History:   Diagnosis Date    Arthritis     Cataract     Coronary artery disease     Digestive disorder     Encounter for blood transfusion 04/13/2023    Heart disease     Hypertension     Type 2 diabetes mellitus without complication, without long-term current use of insulin 12/30/2022    Vision loss        Past Surgical History:   Procedure Laterality Date    CARDIAC SURGERY      CATARACT EXTRACTION Bilateral     CORNEAL TRANSPLANT Left 03/29/2021    L cubital tunnel release      open heart surg      PENETRATING KERATOPLASTY Left 3/29/2021    Procedure: KERATOPLASTY, PENETRATING;  Surgeon: Jerome Connors MD;  Location: [facility] OR;  Service: Ophthalmology;  Laterality: Left;    RETINAL DETACHMENT SURGERY      surgery keratectomy      TRANSESOPHAGEAL ECHOCARDIOGRAPHY N/A 7/19/2023    Procedure: ECHOCARDIOGRAM, TRANSESOPHAGEAL;  Surgeon: Jaswant Hercules MD;  Location: [facility] CATH LAB;  Service: Cardiology;  Laterality: N/A;       Review of patient's allergies indicates:  No Known Allergies    PTA Medications   Medication Sig    ascorbic acid, vitamin C, (VITAMIN C) 500 MG tablet Take 500 mg by mouth once daily.    cyanocobalamin (VITAMIN B-12) 1000 MCG tablet Take 5,000 mcg by mouth once daily.    ergocalciferol, vitamin D2, (VITAMIN D ORAL) Take 1 tablet by mouth once daily.    lactulose (CHRONULAC) 10 gram/15 mL solution Take 10 g by mouth 2 (two) times daily.    omega-3 fatty acids/fish oil (FISH OIL-OMEGA-3 FATTY ACIDS) 300-1,000 mg capsule Take 1 capsule by mouth once daily.    omeprazole (PRILOSEC) 10 MG capsule Take 10 mg by mouth once daily.    spironolactone (ALDACTONE) 25 MG tablet Take 1 tablet (25 mg total) by mouth once daily.    calcium carbonate/vitamin D3 (CALCIUM 600 + D,3, ORAL) Take 1 tablet by mouth Daily.    furosemide (LASIX) 40 MG tablet Take 1 tablet (40 mg total) by mouth once daily.    prednisoLONE acetate (PRED FORTE) 1 % DrpS Place 1 drop into the left eye 4 (four) times  daily.     Family History       Problem Relation (Age of Onset)    Heart disease Father    No Known Problems Mother          Tobacco Use    Smoking status: Former     Types: Cigarettes    Smokeless tobacco: Never    Tobacco comments:     Quit smoking 17      years ago   Substance and Sexual Activity    Alcohol use: Not Currently    Drug use: Never    Sexual activity: Yes     Partners: Female     Review of Systems   Constitutional: Negative for fever and malaise/fatigue.   Eyes:  Negative for blurred vision and pain.   Cardiovascular:  Negative for chest pain, dyspnea on exertion, leg swelling, orthopnea, palpitations and paroxysmal nocturnal dyspnea.   Respiratory:  Negative for cough, shortness of breath, sputum production and wheezing.    Hematologic/Lymphatic: Negative for adenopathy and bleeding problem.   Skin:  Negative for rash.   Musculoskeletal:  Negative for back pain and neck pain.   Gastrointestinal:  Negative for abdominal pain, constipation, diarrhea, nausea and vomiting.   Genitourinary:  Negative for dysuria.   Neurological:  Negative for dizziness, headaches, light-headedness and weakness.     Objective:     Vital Signs (Most Recent):  Temp: 98.6 °F (37 °C) (12/21/23 0609)  Pulse: (!) 59 (12/21/23 0609)  Resp: 18 (12/21/23 0609)  BP: (!) 124/58 (12/21/23 0609)  SpO2: 97 % (12/21/23 0609) Vital Signs (24h Range):  Temp:  [98.6 °F (37 °C)] 98.6 °F (37 °C)  Pulse:  [59] 59  Resp:  [18] 18  SpO2:  [97 %] 97 %  BP: (124-138)/(58-62) 124/58     Weight: 76.2 kg (168 lb)  Body mass index is 24.11 kg/m².    SpO2: 97 %       No intake or output data in the 24 hours ending 12/21/23 0712    Lines/Drains/Airways       Central Venous Catheter Line  Duration             Introducer 12/21/23 0643 <1 day              Arterial Line  Duration             Arterial Line 12/21/23 0643 <1 day              Peripheral Intravenous Line  Duration                  Peripheral IV - Single Lumen 12/21/23 0631 20 G Left;Posterior  "Forearm <1 day                     Physical Exam  Constitutional:       General: He is not in acute distress.     Appearance: Normal appearance. He is not ill-appearing, toxic-appearing or diaphoretic.   HENT:      Head: Normocephalic and atraumatic.      Nose: Nose normal.   Eyes:      Extraocular Movements: Extraocular movements intact.      Pupils: Pupils are equal, round, and reactive to light.   Cardiovascular:      Rate and Rhythm: Normal rate and regular rhythm.      Heart sounds:      No friction rub. No gallop.   Pulmonary:      Effort: Pulmonary effort is normal. No respiratory distress.      Breath sounds: Normal breath sounds. No wheezing or rales.   Abdominal:      General: Abdomen is flat. There is no distension.      Palpations: Abdomen is soft. There is no mass.      Tenderness: There is no abdominal tenderness.   Musculoskeletal:         General: No swelling. Normal range of motion.      Cervical back: Normal range of motion. No rigidity.      Right lower leg: No edema.      Left lower leg: No edema.   Skin:     General: Skin is warm and dry.      Coloration: Skin is not jaundiced.      Findings: No bruising.   Neurological:      General: No focal deficit present.      Mental Status: He is alert and oriented to person, place, and time.      Cranial Nerves: No cranial nerve deficit.      Motor: No weakness.            Significant Labs: BMP: No results for input(s): "GLU", "NA", "K", "CL", "CO2", "BUN", "CREATININE", "CALCIUM", "MG" in the last 48 hours. and CBC   Recent Labs   Lab 12/21/23  0625   WBC 3.15*   HGB 8.6*   HCT 25.7*   PLT 97*       Significant Imaging: Reviewed   "

## 2023-12-21 NOTE — HPI
Mr. Asif Nolen is a 68 y.o. patient with a past medical history of DM, HTN, CAD, s/p CABG (LIMA to LAD) and AVR with 25 mm Perimount valve (2016 at ), liver cirrhosis with grade 4-5 varices with recent banding in 01/2023. According to the patient, he started to feel intermittent episodes of dyspnea on exertion, being able to walk half block. No lower extremity edema, syncope or palpitations.      Patient with prior notice of degeneration of bioprosthetic valve at [place] with severe AR (NYHA Class III sx) and stenosis with previous TAVR aborted due to low intraprocedural gradient across the aortic valve. Referred by Dr Granados for evaluation of failing bioprosthetic valve.      The patient has undergone the following TAVR work-up:   ECHO (Date 11/14/2023): AYAH= 1.39 cm2, MG= 26 mmHg, Peak Akira= 3.24 m/s, EF= 65%. DI: 0.42  LHC (Date ): non-obstructive coronary disease (Need to get records from OSH)   STS: 4%   Frailty: 1/4   Iliacs are >7 on R and > 6.5 on L   LVOT area by CTA is 3.76 cm2 (26.6 mm X 20.2 mm) and Avg Diameter is 21.9 per Dr Haley  Incidental findings on CT: Cirrhotic liver morphology. Sequela of portal venous hypertension including splenomegaly and upper abdominal venous collateral vessel formation. Avascular necrosis of the femoral heads.  CT Surgery risk assessment: High risk, per Dr Galan due to esophageal varices, redo sternotomy, thrombocytopenia  Rhythm issues: Afib  PFTs: deferred   KCCQ/5 meter walk: 3.9  Comorbidities: Afib, liver cirrhosis, prior sternotomy, HTN    Admitted to Cardiac Stepdown Unit for further management.

## 2023-12-21 NOTE — HPI
67 y.o. with hx of DM, HTN, CAD, s/p CABG ( LIMA to LAD) and AVR with  25 mm Perimount valve 2016 at  , liver cirrhosis with grade 4-5 varices, recent banding in Jan 2023, hx of retinal detachment.     Pt was noted to have degeneration of bioprosthetic valve at [place], with stenosis, TAVR aborted due to low intraprocedural gradient across the aortic valve. Pt here for second opinion.     Pt reports occ BARNARD, but can walk 1/2 block, no c/p no LE edema , no syncope, no palpitations     Denies current Tob, ETOH or drug use     Reports he still has BARNARD and fatigue , limiting his exercise tolerance  No c/p no syncope no LE edema     Asif Nolen is a 67 y.o. male referred by Dr Granados for evaluation of failing bioprosthetic valve with severe AR (NYHA Class III sx).     The patient has undergone the following TAVR work-up:   ECHO (Date 11/14/2023): AYAH= 1.39 cm2, MG= 26 mmHg, Peak Akira= 3.24 m/s, EF= 65%. DI: 0.42  LHC (Date ): non-obstructive coronary disease (Need to get records from OSH)   STS: 4%   Frailty: 1/4   Iliacs are >7 on R and > 6.5 on L   LVOT area by CTA is 3.76 cm2 (26.6 mm X 20.2 mm) and Avg Diameter is 21.9 per Dr Haley  Incidental findings on CT: Cirrhotic liver morphology. Sequela of portal venous hypertension including splenomegaly and upper abdominal venous collateral vessel formation. Avascular necrosis of the femoral heads.  CT Surgery risk assessment: High risk, per Dr Galan due to esophageal varices, redo sternotomy, thrombocytopenia  Rhythm issues: Afib  PFTs: deferred   KCCQ/5 meter walk: 3.9  Comorbidities: Afib, liver cirrhosis, prior sternotomy, HTN

## 2023-12-21 NOTE — ANESTHESIA PROCEDURE NOTES
Arterial    Diagnosis: AS    Patient location during procedure: done in OR    Staffing  Authorizing Provider: Lucho Villanueva MD  Performing Provider: Ephraim Marin DO    Staffing  Performed by: Ephraim Marin DO  Authorized by: Lucho Villanueva MD    Anesthesiologist was present at the time of the procedure.    Preanesthetic Checklist  Completed: patient identified, IV checked, site marked, risks and benefits discussed, surgical consent, monitors and equipment checked, pre-op evaluation, timeout performed and anesthesia consent givenArterial  Skin Prep: chlorhexidine gluconate and isopropyl alcohol  Orientation: right  Location: radial    Catheter Size: 20 G  Catheter placement by Ultrasound guidance. Heme positive aspiration all ports.   Vessel Caliber: patent  Needle advanced into vessel with real time Ultrasound guidance.Insertion Attempts: 1  Assessment  Dressing: secured with tape and tegaderm  Patient: Tolerated well

## 2023-12-21 NOTE — Clinical Note
The catheter was repositioned into the ostial LIMA graft. An angiography was performed of the graft. Multiple views were taken.  And was removed.

## 2023-12-21 NOTE — NURSING TRANSFER
Nursing Transfer Note      12/21/2023   1:49 PM      Transfer From: PACU    Transfer via stretcher    Transfer with cardiac monitoring    Transported by Dana MARTIN and tech    Telemetry: Rhythm SB/ Paced  Order for Tele Monitor? Yes    Additional Lines: TVP, Cordis    4eyes on Skin: yes    Medicines sent: 0.9% Nacl gtts at 150ml/hr  Any special needs or follow-up needed: Bedrest    Patient belongings transferred with patient: Yes    Chart send with patient: Yes    Notified: spouse    Upon arrival to floor: cardiac monitor applied, patient oriented to room, call bell in reach, and bed in lowest position    Left and Right groin site CDI, no bleeding, no hematoma, DP 1+ via Doppler bilaterally, PT 2+ vbia Doppler bilaterally

## 2023-12-21 NOTE — Clinical Note
Appointment was confirmed and went over registration details with patient   Chiquita Almendarez,   The catheter was secured in place

## 2023-12-21 NOTE — ASSESSMENT & PLAN NOTE
- s/p TAVR without new episodes of shortness of breath or chest pain. Will continue to monitor rhythm and echo post procedure.     Plan   - admit to Cardiac SDU  -cardiac diet   -resume home lasix 40 daily  -resume home spironolactone 25 daily   - aspirin 81mg daily   - PRN pain meds  - formal echo post TAVR  - CBC  - Coag studies   -monitor vitals and electrolytes

## 2023-12-21 NOTE — PROGRESS NOTES
Nursing Transfer Note        Reason patient is being transferred: to assigned room post recovery    Transfer To: 304    Transfer via stretcher    Transfer with cardiac monitoring. TVP in place VVI, mA 25, V sensitivity 2.0, rate 40. Settings verified with bedside RN on arrival to room.    Transported by RN and escort    Telemetry: Box Number 0012, Rate 47, and Rhythm SB    Medicines sent: IVF    Any special needs or follow-up needed: bedrest and frequent checks complete at 1400    Patient belongings transferred with patient: No- belongings with family    Chart send with patient: Yes    Notified: spouse    Patient reassessed at: to be done by receiving RN (date, time)

## 2023-12-21 NOTE — Clinical Note
The catheter was inserted into the aorta. The angiography was performed via power injection. The injected amount was 30 mL contrast at 15 mL/s. The PSI from the power injection was 1000.  And was removed.

## 2023-12-21 NOTE — ANESTHESIA PROCEDURE NOTES
Central Line    Diagnosis: AS  Patient location during procedure: done in OR    Staffing  Authorizing Provider: Lucho Villanueva MD  Performing Provider: Ephraim Marin DO    Staffing  Performed by: Ephraim Marin DO  Authorized by: Lucho Villanueva MD    Anesthesiologist was present at the time of the procedure.  Preanesthetic Checklist  Completed: patient identified, IV checked, site marked, risks and benefits discussed, surgical consent, monitors and equipment checked, pre-op evaluation, timeout performed and anesthesia consent given  Indication   Indication: hemodynamic monitoring, vascular access     Anesthesia   local infiltration    Central Line   Skin Prep: skin prepped with ChloraPrep, skin prep agent completely dried prior to procedure  Sterile Barriers Followed: Yes    All five maximal barriers used- gloves, gown, cap, mask, and large sterile sheet    hand hygiene performed prior to central venous catheter insertion  Location: right internal jugular.   Catheter type: introducer  Catheter Size: 7 Fr  Ultrasound: vascular probe with ultrasound   Vessel Caliber: patent     Manometry: Venous cannualation confirmed by visual estimation of blood vessel pressure using manometry.  Insertion Attempts: 2   Securement:line sutured, chlorhexidine patch, sterile dressing applied and blood return through all ports    Post-Procedure    Adverse Events:none      Guidewire Guidewire removed intact. Guidewire removed intact, verified with nurse.

## 2023-12-21 NOTE — H&P
[facility] - Cardiology Stepdown  Cardiology  History and Physical     Patient Name: Asif Nolen  MRN: [id number]  Admission Date: 12/21/2023  Code Status: Full Code   Attending Provider: Damian Maldonado MD   Primary Care Physician: No, Primary Doctor  Principal Problem:S/P TAVR (transcatheter aortic valve replacement)    Patient information was obtained from patient and ER records.     Subjective:     Chief Complaint:  s/p TAVR      HPI:  Mr. Asif Nolen is a 68 y.o. patient with a past medical history of DM, HTN, CAD, s/p CABG (LIMA to LAD) and AVR with 25 mm Perimount valve (2016 at ), liver cirrhosis with grade 4-5 varices with recent banding in 01/2023. According to the patient, he started to feel intermittent episodes of dyspnea on exertion, being able to walk half block. No lower extremity edema, syncope or palpitations.      Patient with prior notice of degeneration of bioprosthetic valve at [place] with severe AR (NYHA Class III sx) and stenosis with previous TAVR aborted due to low intraprocedural gradient across the aortic valve. Referred by Dr Granados for evaluation of failing bioprosthetic valve.      The patient has undergone the following TAVR work-up:   ECHO (Date 11/14/2023): AYAH= 1.39 cm2, MG= 26 mmHg, Peak Akira= 3.24 m/s, EF= 65%. DI: 0.42  LHC (Date ): non-obstructive coronary disease (Need to get records from OSH)   STS: 4%   Frailty: 1/4   Iliacs are >7 on R and > 6.5 on L   LVOT area by CTA is 3.76 cm2 (26.6 mm X 20.2 mm) and Avg Diameter is 21.9 per Dr Haley  Incidental findings on CT: Cirrhotic liver morphology. Sequela of portal venous hypertension including splenomegaly and upper abdominal venous collateral vessel formation. Avascular necrosis of the femoral heads.  CT Surgery risk assessment: High risk, per Dr Galan due to esophageal varices, redo sternotomy, thrombocytopenia  Rhythm issues: Afib  PFTs: deferred   KCCQ/5 meter walk: 3.9  Comorbidities: Afib, liver cirrhosis,  prior sternotomy, HTN    Admitted to Cardiac Stepdown Unit for further management.     Past Medical History:   Diagnosis Date    Arthritis     Cataract     Coronary artery disease     Digestive disorder     Encounter for blood transfusion 04/13/2023    Heart disease     Hypertension     Type 2 diabetes mellitus without complication, without long-term current use of insulin 12/30/2022    Vision loss        Past Surgical History:   Procedure Laterality Date    CARDIAC SURGERY      CATARACT EXTRACTION Bilateral     CORNEAL TRANSPLANT Left 03/29/2021    L cubital tunnel release      open heart surg      PENETRATING KERATOPLASTY Left 3/29/2021    Procedure: KERATOPLASTY, PENETRATING;  Surgeon: Jerome Connors MD;  Location: [facility] OR;  Service: Ophthalmology;  Laterality: Left;    RETINAL DETACHMENT SURGERY      surgery keratectomy      TRANSESOPHAGEAL ECHOCARDIOGRAPHY N/A 7/19/2023    Procedure: ECHOCARDIOGRAM, TRANSESOPHAGEAL;  Surgeon: Jaswant Hercules MD;  Location: [facility] CATH LAB;  Service: Cardiology;  Laterality: N/A;       Review of patient's allergies indicates:  No Known Allergies    No current facility-administered medications on file prior to encounter.     Current Outpatient Medications on File Prior to Encounter   Medication Sig    ascorbic acid, vitamin C, (VITAMIN C) 500 MG tablet Take 500 mg by mouth once daily.    cyanocobalamin (VITAMIN B-12) 1000 MCG tablet Take 5,000 mcg by mouth once daily.    ergocalciferol, vitamin D2, (VITAMIN D ORAL) Take 1 tablet by mouth once daily.    lactulose (CHRONULAC) 10 gram/15 mL solution Take 10 g by mouth 2 (two) times daily.    omega-3 fatty acids/fish oil (FISH OIL-OMEGA-3 FATTY ACIDS) 300-1,000 mg capsule Take 1 capsule by mouth once daily.    omeprazole (PRILOSEC) 10 MG capsule Take 10 mg by mouth once daily.    spironolactone (ALDACTONE) 25 MG tablet Take 1 tablet (25 mg total) by mouth once daily.    calcium carbonate/vitamin D3 (CALCIUM 600 + D,3, ORAL)  Take 1 tablet by mouth Daily.    furosemide (LASIX) 40 MG tablet Take 1 tablet (40 mg total) by mouth once daily.    prednisoLONE acetate (PRED FORTE) 1 % DrpS Place 1 drop into the left eye 4 (four) times daily.    [DISCONTINUED] benzonatate (TESSALON) 100 MG capsule SMARTSI Capsule(s) By Mouth Every 8 Hours PRN    [DISCONTINUED] cycloSPORINE (RESTASIS) 0.05 % ophthalmic emulsion Place 1 drop into both eyes 2 (two) times daily. (Patient not taking: Reported on 2023)    [DISCONTINUED] doxycycline (VIBRAMYCIN) 100 MG Cap Take 100 mg by mouth 2 (two) times daily.    [DISCONTINUED] ezetimibe (ZETIA) 10 mg tablet     [DISCONTINUED] IRON, FERROUS SULFATE, ORAL Take 1 tablet by mouth every other day.    [DISCONTINUED] metFORMIN (GLUCOPHAGE) 500 MG tablet Take 500 mg by mouth daily with breakfast.    [DISCONTINUED] omeprazole (PRILOSEC) 20 MG capsule 10 mg once daily.    [DISCONTINUED] promethazine-codeine 6.25-10 mg/5 ml (PHENERGAN WITH CODEINE) 6.25-10 mg/5 mL syrup Take 5-10 mLs by mouth every 4 (four) hours as needed.    [DISCONTINUED] promethazine-dextromethorphan (PROMETHAZINE-DM) 6.25-15 mg/5 mL Syrp Take 5 mLs by mouth every 6 (six) hours as needed.    [DISCONTINUED] tiZANidine (ZANAFLEX) 4 MG tablet SMARTSI Tablet(s) By Mouth Every 12 Hours PRN     Family History       Problem Relation (Age of Onset)    Heart disease Father    No Known Problems Mother          Tobacco Use    Smoking status: Former     Types: Cigarettes    Smokeless tobacco: Never    Tobacco comments:     Quit smoking 17      years ago   Substance and Sexual Activity    Alcohol use: Not Currently    Drug use: Never    Sexual activity: Yes     Partners: Female     Review of Systems   Constitutional: Negative for fever and malaise/fatigue.   Eyes:  Negative for blurred vision and pain.   Cardiovascular:  Negative for chest pain, dyspnea on exertion, leg swelling, orthopnea, palpitations and paroxysmal nocturnal dyspnea.   Respiratory:   Negative for cough, shortness of breath, sputum production and wheezing.    Hematologic/Lymphatic: Negative for adenopathy and bleeding problem.   Skin:  Negative for rash.   Musculoskeletal:  Negative for back pain and neck pain.   Gastrointestinal:  Negative for abdominal pain, constipation, diarrhea, nausea and vomiting.   Genitourinary:  Negative for dysuria.   Neurological:  Negative for dizziness, headaches, light-headedness and weakness.     Objective:     Vital Signs (Most Recent):  Temp: 97.3 °F (36.3 °C) (12/21/23 1418)  Pulse: (!) 49 (12/21/23 1506)  Resp: 18 (12/21/23 1418)  BP: (!) 126/59 (12/21/23 1418)  SpO2: 100 % (12/21/23 1300) Vital Signs (24h Range):  Temp:  [97.3 °F (36.3 °C)-98.6 °F (37 °C)] 97.3 °F (36.3 °C)  Pulse:  [47-59] 49  Resp:  [10-18] 18  SpO2:  [91 %-100 %] 100 %  BP: ()/(45-64) 126/59  Arterial Line BP: ()/(28-61) 136/61     Weight: 76.2 kg (168 lb)  Body mass index is 24.11 kg/m².    SpO2: 100 %         Intake/Output Summary (Last 24 hours) at 12/21/2023 1516  Last data filed at 12/21/2023 1317  Gross per 24 hour   Intake 511.26 ml   Output 475 ml   Net 36.26 ml       Lines/Drains/Airways       Central Venous Catheter Line  Duration             Introducer 12/21/23 0643 <1 day              Peripheral Intravenous Line  Duration                  Peripheral IV - Single Lumen 12/21/23 0631 20 G Left;Posterior Forearm <1 day         Peripheral IV - Single Lumen 12/21/23 1015 18 G Anterior;Proximal;Right Antecubital <1 day                     Physical Exam  Constitutional:       General: He is not in acute distress.     Appearance: Normal appearance. He is not ill-appearing, toxic-appearing or diaphoretic.   HENT:      Head: Normocephalic and atraumatic.      Nose: Nose normal.   Eyes:      Extraocular Movements: Extraocular movements intact.      Pupils: Pupils are equal, round, and reactive to light.   Cardiovascular:      Rate and Rhythm: Normal rate and regular rhythm.       Heart sounds:      No friction rub. No gallop.   Pulmonary:      Effort: Pulmonary effort is normal. No respiratory distress.      Breath sounds: Normal breath sounds. No wheezing or rales.   Abdominal:      General: Abdomen is flat. There is no distension.      Palpations: Abdomen is soft. There is no mass.      Tenderness: There is no abdominal tenderness.   Musculoskeletal:         General: No swelling. Normal range of motion.      Cervical back: Normal range of motion. No rigidity.      Right lower leg: No edema.      Left lower leg: No edema.   Skin:     General: Skin is warm and dry.      Coloration: Skin is not jaundiced.      Findings: No bruising.   Neurological:      General: No focal deficit present.      Mental Status: He is alert and oriented to person, place, and time.      Cranial Nerves: No cranial nerve deficit.      Motor: No weakness.          Significant Labs: CMP   Recent Labs   Lab 12/21/23  0625   *   K 4.5      CO2 22*   *   BUN 21   CREATININE 0.9   CALCIUM 9.2   PROT 7.2   ALBUMIN 3.4*   BILITOT 1.2*   ALKPHOS 100   AST 35   ALT 23   ANIONGAP 6*    and CBC   Recent Labs   Lab 12/21/23  0625   WBC 3.15*   HGB 8.6*   HCT 25.7*   PLT 97*       Significant Imaging: Cardiac Cath: TAVR   Assessment and Plan:     * S/P TAVR (transcatheter aortic valve replacement)  - s/p TAVR without new episodes of shortness of breath or chest pain. Will continue to monitor rhythm and echo post procedure.     Plan   - admit to Cardiac SDU  -cardiac diet   -resume home lasix 40 daily  -resume home spironolactone 25 daily   - aspirin 81mg daily   - PRN pain meds  - formal echo post TAVR  - CBC  - Coag studies   -monitor vitals and electrolytes     Aortic stenosis  See transcatheter aortic valve placement     Esophageal varices without bleeding  - resume home lactulose     Peptic ulcer disease  - continue at home pantoprazole     Type 2 diabetes mellitus without complication, without long-term  current use of insulin  - closely monitor BG as inpatient. No need at the moment for insulin management.       VTE Risk Mitigation (From admission, onward)           Ordered     IP VTE HIGH RISK PATIENT  Once         12/21/23 1634     heparin infusion 1,000 units/500 ml in 0.9% NaCl (on sterile field)  As needed (PRN)         12/21/23 7416                    Shawn Esquivel MD  Cardiology   [facility] - Cardiology Stepdown

## 2023-12-21 NOTE — ANESTHESIA POSTPROCEDURE EVALUATION
Anesthesia Post Evaluation    Patient: Asif Nolen    Procedure(s) Performed: Procedure(s) (LRB):  REPLACEMENT, AORTIC VALVE, TRANSCATHETER (TAVR) (N/A)  Cardiac Cath Cosurgeon (N/A)  REPLACEMENT, AORTIC VALVE, TRANSCATHETER (TAVR)  Bypass graft study  AORTOGRAM (N/A)    Final Anesthesia Type: general (Natural airway)      Patient location during evaluation: PACU  Patient participation: Yes- Able to Participate  Level of consciousness: awake and alert  Post-procedure vital signs: reviewed and stable  Pain management: adequate  Airway patency: patent    PONV status at discharge: No PONV  Anesthetic complications: no      Cardiovascular status: hemodynamically stable  Respiratory status: unassisted  Hydration status: euvolemic  Follow-up not needed.              Vitals Value Taken Time   /68 12/21/23 1527   Temp 36.3 °C (97.3 °F) 12/21/23 1418   Pulse 53 12/21/23 1527   Resp 18 12/21/23 1418   SpO2 100 % 12/21/23 1324   Vitals shown include unvalidated device data.      No case tracking events are documented in the log.      Pain/Linda Score: Linda Score: 10 (12/21/2023 11:30 AM)

## 2023-12-21 NOTE — ANESTHESIA RELEASE NOTE
"Anesthesia Release from PACU Note    Patient: Asif Nolen    Procedure(s) Performed: Procedure(s) (LRB):  REPLACEMENT, AORTIC VALVE, TRANSCATHETER (TAVR) (N/A)  Cardiac Cath Cosurgeon (N/A)  REPLACEMENT, AORTIC VALVE, TRANSCATHETER (TAVR)  Bypass graft study  AORTOGRAM (N/A)    Anesthesia type: general    Post pain: Adequate analgesia    Post assessment: no apparent anesthetic complications    Last Vitals: Visit Vitals  BP (!) 115/56   Pulse (!) 48   Temp 36.5 °C (97.7 °F) (Temporal)   Resp 13   Ht 5' 10" (1.778 m)   Wt 76.2 kg (168 lb)   SpO2 100%   BMI 24.11 kg/m²       Post vital signs: stable    Level of consciousness: awake    Nausea/Vomiting: no nausea/no vomiting    Complications: none    Airway Patency: patent    Respiratory: unassisted    Cardiovascular: stable and blood pressure at baseline    Hydration: euvolemic  "

## 2023-12-21 NOTE — ASSESSMENT & PLAN NOTE
Transcatheter Aortic valve replacement   Valve: 26 mm Medtronic Evolute valve   ECMO:  On Stand by  TAVR access:  RTFA access   Valvuloplasty balloon: TBD   Viabahn size if needed: TBD  Antithrombotic therapy: Aspirin   Temporary pacing wire: No, Will pace from TAVR wire  Pacemaker risk factors: Sinus bradycardia    Post op destination: ICU  Antibiotics given: (to be done during procedure)  Heart failure on admission? No   CONSENTING:  The patient was told that the procedure carries around a 12.5% risk of permanent pacemaker requirement and that risk depends on the patients underlying conduction system.  Prior to the clin visit, all available records from referring provider were reviewed.  I have personally reviewed all the lab tests available related to the patient's case  The patient's images were reviewed by myself and the procedural planning was done with my own interpretation of the iliac and aortic anatomy based on CTA, angiography or REESE. I have reviewed all other imaging studies relevant to the patient including echocardiography and coronary angiography.  Patient was educated abut the pathophysiology and natural history of severe aortic stenosis and was educated about the treatment options including surgical and transcatheter valve replacement. She agrees to be full code for the forseable future and to undergo workup for treatment of severe AS.   The risks, benefits, and alternatives of transcatheter aortic valve replacement were discussed with the patient. All questions were answered and informed consent was obtained. I had a detailed discussion with the patient regarding risk of stroke, MI, bleeding access site complications including limb loss, allergy, kidney failure including dialysis and death.  The patient understands the risks and benefits and wishes to go ahead with the procedure.  The referring Cardiologist was notified of the plan  All patient's questions were answered    Shared Decision  Making:  This patient was seen today by a multidisciplinary heart team involving both a Structural Heart Specialist (Interventional Cardiologist) and a Cardiothoracic Surgeon. The patient was involved in shared decision-making to define clearer goals for treatment and align health decisions with their current values.  The patient understands the risks and expected benefits of their treatment options.

## 2023-12-21 NOTE — Clinical Note
The catheter was inserted into the ostium   right coronary artery. An angiography was performed of the right coronary arteries.  And was removed.

## 2023-12-21 NOTE — OP NOTE
[facility]  Cardiothoracic Surgery Operative Report    Patient Name:  Asif Nolen; [id number]    DATE OF PROCEDURE: 12/21/2023   ATTENDING SURGEONS: Reinier Haley MD, and Vinnie Lentz M.D.  PREOPERATIVE DIAGNOSIS:  Severe aortic stenosis.  POSTOPERATIVE DIAGNOSIS:  Severe aortic stenosis  ?  OPERATION PERFORMED: Transcatheter aortic valve insertion via transfemoral approach using a 26mm Arevalo Katie S3 bioprosthesis  ANESTHESIA: General.  ESTIMATED BLOOD LOSS: 10 mL.  BRIEF HISTORY: This patient is a 68 year old male with symptomatic severe aortic stenosis.  The patient has had progressive dyspnea on exertion. This prompted a thoughtful and thorough evaluation, which demonstrated severe aortic stenosis. Given the comorbid conditions, a transcatheter valve insertion was recommended. The patient now presents for transcatheter aortic valve insertion.  PROCEDURE: After obtaining informed and written consent, the patient was brought to the cath lab and placed on the cath table in supine position. After induction of adequate anesthesia, a transvenous pacing wire was placed.  Bilateral femoral arterial and femoral venous access was obtained. A wire was advanced across the aortic valve. It was exchanged for stiff wire, and an aortic balloon was placed. Under rapid ventricular pacing, balloon valvuloplasty was performed. The balloon was then withdrawn, and a valve was advanced to the level of the aortic annulus. Once the team was satisfied that the valve was in proper position, it was deployed. Excellent positioning was obtained. Post-procedure echo demonstrated no aortic insufficiency. The femoral access sites were controlled using percutaneous techniques. The patient tolerated the procedure well, there were no complications. At the conclusion of the case, sponge and instrument counts were correct.

## 2023-12-21 NOTE — BRIEF OP NOTE
Brief Operative Note:    : Reinier Haley MD     Referring Physician: Leah Granados     All Operators: Surgeon(s):  Matthias Triplett, MD Pack, MD Steve Leblanc Jordan, MD Jenkins, James S., MD Maitas, Oscar, MD Spindel, Vinnie DEY MD     Preoperative Diagnosis: Aortic stenosis, severe [I35.0]  S/P aortic valve repair [Z98.890]     Postop Diagnosis: Aortic stenosis, severe [I35.0]  S/P aortic valve repair [Z98.890]    Treatments/Procedures: Procedure(s) (LRB):  REPLACEMENT, AORTIC VALVE, TRANSCATHETER (TAVR) (N/A)  Cardiac Cath Cosurgeon (N/A)  REPLACEMENT, AORTIC VALVE, TRANSCATHETER (TAVR)  Bypass graft study  AORTOGRAM (N/A)    Findings:Severe structural disease is present. See catheterization report for full details.    -successful placement of 26mm S3 Janes with post-dilatation with 25mm True balloon   -no PVL noted on aortogram or bedside echo     Estimated Blood loss: 20 cc    Specimens removed: No    Recommendations:   - Routine post-cath care as per orders  - IVF at 150 cc/hr for 4 hrs  - Continue medical management, Risk factor reduction, Follow-up with outpatient cardiologist      Joaquin Pack

## 2023-12-21 NOTE — PROGRESS NOTES
Asif Nolen is a 67 y.o. male referred by Dr Granados for evaluation of failing bioprosthetic valve with severe AR (NYHA Class III sx).Bioprosthetic valve CE Magna Ease 3300 25 mm Valve      The patient has undergone the following TAVR work-up:   ECHO (Date 11/14/2023): AYAH= 1.39 cm2, MG= 26 mmHg, Peak Akira= 3.24 m/s, EF= 65%. DI: 0.42  LHC (Date ): non-obstructive coronary disease (Need to get records from OSH)   STS: 4%   Frailty: 1/4   Iliacs are >7 on R and > 6.5 on L   LVOT area by CTA is 3.76 cm2 (26.6 mm X 20.2 mm) and Avg Diameter is 21.9 per Dr Haley  Incidental findings on CT: Cirrhotic liver morphology. Sequela of portal venous hypertension including splenomegaly and upper abdominal venous collateral vessel formation. Avascular necrosis of the femoral heads.  CT Surgery risk assessment: High risk, per Dr Galan due to esophageal varices, redo sternotomy, thrombocytopenia  Rhythm issues: Afib  PFTs: deferred   KCCQ/5 meter walk: 3.9  Comorbidities: Afib, liver cirrhosis, prior sternotomy, HTN     Transcatheter Aortic valve replacement   Valve: mm 26 Katie s3   ECMO:  On Call  TAVR access: R TFA   Valvuloplasty balloon: Post 26 mm True   Viabahn size if needed: 8 X 5  Antithrombotic therapy: Aspirin   Temporary pacing wire: Yes  Pacemaker risk factors: afib    Post op destination: ICU  Antibiotics given: (to be done during procedure)  Heart failure on admission?  CONSENTING:  The patient was told that the procedure carries around a 12.5% risk of permanent pacemaker requirement and that risk depends on the patients underlying conduction system.  Prior to the clinc visit, all available records from referring provider were reviewed.  I have personally reviewed all the lab tests available related to the patient's case  The patient's images were reviewed by myself and the procedural planning was done with my own interpretation of the iliac and aortic anatomy based on CTA, angiography or REESE. I have reviewed  all other imaging studies relevant to the patient including echocardiography and coronary angiography.  Patient was educated abut the pathophysiology and natural history of severe aortic stenosis and was educated about the treatment options including surgical and transcatheter valve replacement. She agrees to be full code for the forseable future and to undergo workup for treatment of severe AS.   The risks, benefits, and alternatives of transcatheter aortic valve replacement were discussed with the patient. All questions were answered and informed consent was obtained. I had a detailed discussion with the patient regarding risk of stroke, MI, bleeding access site complications including limb loss, allergy, kidney failure including dialysis and death.  The patient understands the risks and benefits and wishes to go ahead with the procedure.  The referring Cardiologist was notified of the plan  All patient's questions were answered    Shared Decision Making:  This patient was seen today by a multidisciplinary heart team involving both a Structural Heart Specialist (Interventional Cardiologist) and a Cardiothoracic Surgeon. The patient was involved in shared decision-making to define clearer goals for treatment and align health decisions with their current values.  The patient understands the risks and expected benefits of their treatment options.

## 2023-12-21 NOTE — NURSING
On call CCU ([id number]) notified about patient's concern about home medication( lactulose, furosemide, and spironolactone). MD stated he will review soon. Patient notified, will continue to monitor.

## 2023-12-21 NOTE — PLAN OF CARE
Plan of care discussed with patient. Patient is free of fall/trauma/injury. Denies CP, SOB, or pain/discomfort. Rt and Lt groin site CDI, guaze and tegaderm in place, no bleeding ,no hematoma. All questions addressed. Will continue to monitor

## 2023-12-21 NOTE — Clinical Note
90 ml of contrast were injected throughout the case. 210 mL of contrast was the total wasted during the case. 300 mL was the total amount used during the case.

## 2023-12-21 NOTE — SUBJECTIVE & OBJECTIVE
Past Medical History:   Diagnosis Date    Arthritis     Cataract     Coronary artery disease     Digestive disorder     Encounter for blood transfusion 04/13/2023    Heart disease     Hypertension     Type 2 diabetes mellitus without complication, without long-term current use of insulin 12/30/2022    Vision loss        Past Surgical History:   Procedure Laterality Date    CARDIAC SURGERY      CATARACT EXTRACTION Bilateral     CORNEAL TRANSPLANT Left 03/29/2021    L cubital tunnel release      open heart surg      PENETRATING KERATOPLASTY Left 3/29/2021    Procedure: KERATOPLASTY, PENETRATING;  Surgeon: Jerome Connors MD;  Location: [facility] OR;  Service: Ophthalmology;  Laterality: Left;    RETINAL DETACHMENT SURGERY      surgery keratectomy      TRANSESOPHAGEAL ECHOCARDIOGRAPHY N/A 7/19/2023    Procedure: ECHOCARDIOGRAM, TRANSESOPHAGEAL;  Surgeon: Jaswant Hercules MD;  Location: [facility] CATH LAB;  Service: Cardiology;  Laterality: N/A;       Review of patient's allergies indicates:  No Known Allergies    No current facility-administered medications on file prior to encounter.     Current Outpatient Medications on File Prior to Encounter   Medication Sig    ascorbic acid, vitamin C, (VITAMIN C) 500 MG tablet Take 500 mg by mouth once daily.    cyanocobalamin (VITAMIN B-12) 1000 MCG tablet Take 5,000 mcg by mouth once daily.    ergocalciferol, vitamin D2, (VITAMIN D ORAL) Take 1 tablet by mouth once daily.    lactulose (CHRONULAC) 10 gram/15 mL solution Take 10 g by mouth 2 (two) times daily.    omega-3 fatty acids/fish oil (FISH OIL-OMEGA-3 FATTY ACIDS) 300-1,000 mg capsule Take 1 capsule by mouth once daily.    omeprazole (PRILOSEC) 10 MG capsule Take 10 mg by mouth once daily.    spironolactone (ALDACTONE) 25 MG tablet Take 1 tablet (25 mg total) by mouth once daily.    calcium carbonate/vitamin D3 (CALCIUM 600 + D,3, ORAL) Take 1 tablet by mouth Daily.    furosemide (LASIX) 40 MG tablet Take 1 tablet (40 mg  total) by mouth once daily.    prednisoLONE acetate (PRED FORTE) 1 % DrpS Place 1 drop into the left eye 4 (four) times daily.    [DISCONTINUED] benzonatate (TESSALON) 100 MG capsule SMARTSI Capsule(s) By Mouth Every 8 Hours PRN    [DISCONTINUED] cycloSPORINE (RESTASIS) 0.05 % ophthalmic emulsion Place 1 drop into both eyes 2 (two) times daily. (Patient not taking: Reported on 2023)    [DISCONTINUED] doxycycline (VIBRAMYCIN) 100 MG Cap Take 100 mg by mouth 2 (two) times daily.    [DISCONTINUED] ezetimibe (ZETIA) 10 mg tablet     [DISCONTINUED] IRON, FERROUS SULFATE, ORAL Take 1 tablet by mouth every other day.    [DISCONTINUED] metFORMIN (GLUCOPHAGE) 500 MG tablet Take 500 mg by mouth daily with breakfast.    [DISCONTINUED] omeprazole (PRILOSEC) 20 MG capsule 10 mg once daily.    [DISCONTINUED] promethazine-codeine 6.25-10 mg/5 ml (PHENERGAN WITH CODEINE) 6.25-10 mg/5 mL syrup Take 5-10 mLs by mouth every 4 (four) hours as needed.    [DISCONTINUED] promethazine-dextromethorphan (PROMETHAZINE-DM) 6.25-15 mg/5 mL Syrp Take 5 mLs by mouth every 6 (six) hours as needed.    [DISCONTINUED] tiZANidine (ZANAFLEX) 4 MG tablet SMARTSI Tablet(s) By Mouth Every 12 Hours PRN     Family History       Problem Relation (Age of Onset)    Heart disease Father    No Known Problems Mother          Tobacco Use    Smoking status: Former     Types: Cigarettes    Smokeless tobacco: Never    Tobacco comments:     Quit smoking 17      years ago   Substance and Sexual Activity    Alcohol use: Not Currently    Drug use: Never    Sexual activity: Yes     Partners: Female     Review of Systems   Constitutional: Negative for fever and malaise/fatigue.   Eyes:  Negative for blurred vision and pain.   Cardiovascular:  Negative for chest pain, dyspnea on exertion, leg swelling, orthopnea, palpitations and paroxysmal nocturnal dyspnea.   Respiratory:  Negative for cough, shortness of breath, sputum production and wheezing.     Hematologic/Lymphatic: Negative for adenopathy and bleeding problem.   Skin:  Negative for rash.   Musculoskeletal:  Negative for back pain and neck pain.   Gastrointestinal:  Negative for abdominal pain, constipation, diarrhea, nausea and vomiting.   Genitourinary:  Negative for dysuria.   Neurological:  Negative for dizziness, headaches, light-headedness and weakness.     Objective:     Vital Signs (Most Recent):  Temp: 97.3 °F (36.3 °C) (12/21/23 1418)  Pulse: (!) 49 (12/21/23 1506)  Resp: 18 (12/21/23 1418)  BP: (!) 126/59 (12/21/23 1418)  SpO2: 100 % (12/21/23 1300) Vital Signs (24h Range):  Temp:  [97.3 °F (36.3 °C)-98.6 °F (37 °C)] 97.3 °F (36.3 °C)  Pulse:  [47-59] 49  Resp:  [10-18] 18  SpO2:  [91 %-100 %] 100 %  BP: ()/(45-64) 126/59  Arterial Line BP: ()/(28-61) 136/61     Weight: 76.2 kg (168 lb)  Body mass index is 24.11 kg/m².    SpO2: 100 %         Intake/Output Summary (Last 24 hours) at 12/21/2023 1516  Last data filed at 12/21/2023 1317  Gross per 24 hour   Intake 511.26 ml   Output 475 ml   Net 36.26 ml       Lines/Drains/Airways       Central Venous Catheter Line  Duration             Introducer 12/21/23 0643 <1 day              Peripheral Intravenous Line  Duration                  Peripheral IV - Single Lumen 12/21/23 0631 20 G Left;Posterior Forearm <1 day         Peripheral IV - Single Lumen 12/21/23 1015 18 G Anterior;Proximal;Right Antecubital <1 day                     Physical Exam  Constitutional:       General: He is not in acute distress.     Appearance: Normal appearance. He is not ill-appearing, toxic-appearing or diaphoretic.   HENT:      Head: Normocephalic and atraumatic.      Nose: Nose normal.   Eyes:      Extraocular Movements: Extraocular movements intact.      Pupils: Pupils are equal, round, and reactive to light.   Cardiovascular:      Rate and Rhythm: Normal rate and regular rhythm.      Heart sounds:      No friction rub. No gallop.   Pulmonary:      Effort:  Pulmonary effort is normal. No respiratory distress.      Breath sounds: Normal breath sounds. No wheezing or rales.   Abdominal:      General: Abdomen is flat. There is no distension.      Palpations: Abdomen is soft. There is no mass.      Tenderness: There is no abdominal tenderness.   Musculoskeletal:         General: No swelling. Normal range of motion.      Cervical back: Normal range of motion. No rigidity.      Right lower leg: No edema.      Left lower leg: No edema.   Skin:     General: Skin is warm and dry.      Coloration: Skin is not jaundiced.      Findings: No bruising.   Neurological:      General: No focal deficit present.      Mental Status: He is alert and oriented to person, place, and time.      Cranial Nerves: No cranial nerve deficit.      Motor: No weakness.          Significant Labs: CMP   Recent Labs   Lab 12/21/23  0625   *   K 4.5      CO2 22*   *   BUN 21   CREATININE 0.9   CALCIUM 9.2   PROT 7.2   ALBUMIN 3.4*   BILITOT 1.2*   ALKPHOS 100   AST 35   ALT 23   ANIONGAP 6*    and CBC   Recent Labs   Lab 12/21/23  0625   WBC 3.15*   HGB 8.6*   HCT 25.7*   PLT 97*       Significant Imaging: Cardiac Cath: TAVR

## 2023-12-21 NOTE — Clinical Note
The catheter was reinserted into the aorta. An angiography was performed of the aorta. The angiography was performed via power injection. The injected amount was 30 mL contrast at 15 mL/s. The PSI from the power injection was 1000.  And was removed.

## 2023-12-21 NOTE — PLAN OF CARE
[facility] - Cardiology Stepdown  Initial Discharge Assessment       Primary Care Provider: No, Primary Doctor    Admission Diagnosis: Aortic stenosis, severe [I35.0]  S/P aortic valve repair [Z98.890]  H/O aortic valve replacement [Z95.2]    Admission Date: 12/21/2023  Expected Discharge Date: 12/22/2023    Transition of Care Barriers: None    Payor: BLUE CROSS BLUE SHIELD / Plan: BCBS ALL OUT OF STATE / Product Type: PPO /     Extended Emergency Contact Information  Primary Emergency Contact: Tamia Nolen  Home Phone: [phone]  Relation: Spouse   needed? No    Discharge Plan A: Home with family  Discharge Plan B: Home      [facility] [place], [place] - [address]  [address] [facility] [address]  Phone: [phone] Fax: [phone]      Initial Assessment (most recent)       Adult Discharge Assessment - 12/21/23 1434          Discharge Assessment    Assessment Type Discharge Planning Assessment     Confirmed/corrected address, phone number and insurance Yes     Confirmed Demographics Correct on Facesheet     Source of Information patient;family;health record     Communicated KAMALA with patient/caregiver Yes     Reason For Admission Severe aortic stenosis     People in Home spouse     Facility Arrived From: Home     Do you expect to return to your current living situation? Yes     Do you have help at home or someone to help you manage your care at home? Yes     Who are your caregiver(s) and their phone number(s)? Tamia Nolen (wife) [phone]     Prior to hospitilization cognitive status: Alert/Oriented     Current cognitive status: Alert/Oriented     Walking or Climbing Stairs Difficulty no     Dressing/Bathing Difficulty no     Home Accessibility stairs within home     Home Layout Able to live on 1st floor     Equipment Currently Used at Home none     Readmission within 30 days? No     Patient currently being followed by outpatient case management? No     Do you currently have service(s) that  help you manage your care at home? No     Do you take prescription medications? Yes     Do you have prescription coverage? Yes     Do you have any problems affording any of your prescribed medications? No     Is the patient taking medications as prescribed? yes     Who is going to help you get home at discharge? Tamia Nolen (wife) [phone]     How do you get to doctors appointments? car, drives self;family or friend will provide     Are you on dialysis? No     Do you take coumadin? No     Discharge Plan A Home with family     Discharge Plan B Home     DME Needed Upon Discharge  none     Discharge Plan discussed with: Spouse/sig other;Patient     Name(s) and Number(s) Tamia Nolen (wife) [phone]     Transition of Care Barriers None        Physical Activity    On average, how many days per week do you engage in moderate to strenuous exercise (like a brisk walk)? 3 days     On average, how many minutes do you engage in exercise at this level? 90 min        Financial Resource Strain    How hard is it for you to pay for the very basics like food, housing, medical care, and heating? Not hard at all        Housing Stability    In the last 12 months, was there a time when you were not able to pay the mortgage or rent on time? No     In the last 12 months, how many places have you lived? 1     In the last 12 months, was there a time when you did not have a steady place to sleep or slept in a shelter (including now)? No        Transportation Needs    In the past 12 months, has lack of transportation kept you from medical appointments or from getting medications? No     In the past 12 months, has lack of transportation kept you from meetings, work, or from getting things needed for daily living? No        Food Insecurity    Within the past 12 months, you worried that your food would run out before you got the money to buy more. Never true     Within the past 12 months, the food you bought just didn't last and  you didn't have money to get more. Never true        Stress    Do you feel stress - tense, restless, nervous, or anxious, or unable to sleep at night because your mind is troubled all the time - these days? Not at all        Social Connections    In a typical week, how many times do you talk on the phone with family, friends, or neighbors? More than three times a week     How often do you get together with friends or relatives? More than three times a week     How often do you attend Synagogue or Nondenominational services? More than 4 times per year     Do you belong to any clubs or organizations such as Synagogue groups, unions, fraternal or athletic groups, or school groups? No     How often do you attend meetings of the clubs or organizations you belong to? Never     Are you , , , , never , or living with a partner?         Alcohol Use    Q1: How often do you have a drink containing alcohol? Never     Q2: How many drinks containing alcohol do you have on a typical day when you are drinking? Patient does not drink     Q3: How often do you have six or more drinks on one occasion? Never        OTHER    Name(s) of People in Home Tamia Nolen (wife) [phone]                 SW met with pt and wife at bedside to discuss discharge planning.  Pt lives with his wife in a two-story house and is independent with ambulation and ADLs.  Pt will have transportation and assistance from his wife at discharge.  Discharge Plan A and Plan B have been determined by review of patient's clinical status, future medical and therapeutic needs, and coverage/benefits for post-acute care in coordination with multidisciplinary team members.  Discharge planning booklet provided.  Will continue to follow.      Heidy Abdalla, LORENZO  [facility]  [id number]

## 2023-12-21 NOTE — TRANSFER OF CARE
"Anesthesia Transfer of Care Note    Patient: Asif Nolen    Procedure(s) Performed: Procedure(s) (LRB):  REPLACEMENT, AORTIC VALVE, TRANSCATHETER (TAVR) (N/A)  Cardiac Cath Cosurgeon (N/A)  REPLACEMENT, AORTIC VALVE, TRANSCATHETER (TAVR)  Bypass graft study  AORTOGRAM (N/A)    Patient location: PACU    Anesthesia Type: general    Transport from OR: Transported from OR on 6-10 L/min O2 by face mask with adequate spontaneous ventilation    Post pain: adequate analgesia    Post assessment: no apparent anesthetic complications    Post vital signs: stable    Level of consciousness: awake    Nausea/Vomiting: no nausea/vomiting    Complications: none    Transfer of care protocol was followed      Last vitals: Visit Vitals  BP (!) 124/58 (BP Location: Right arm, Patient Position: Lying)   Pulse (!) 59   Temp 37 °C (98.6 °F) (Temporal)   Resp 18   Ht 5' 10" (1.778 m)   Wt 76.2 kg (168 lb)   SpO2 97%   BMI 24.11 kg/m²     "

## 2023-12-22 ENCOUNTER — TELEPHONE (OUTPATIENT)
Dept: CARDIOLOGY | Facility: CLINIC | Age: 68
End: 2023-12-22
Payer: COMMERCIAL

## 2023-12-22 ENCOUNTER — CLINICAL SUPPORT (OUTPATIENT)
Dept: CARDIOLOGY | Facility: HOSPITAL | Age: 68
DRG: 267 | End: 2023-12-22
Attending: INTERNAL MEDICINE
Payer: COMMERCIAL

## 2023-12-22 VITALS
HEART RATE: 71 BPM | BODY MASS INDEX: 23.91 KG/M2 | WEIGHT: 167 LBS | SYSTOLIC BLOOD PRESSURE: 140 MMHG | HEIGHT: 70 IN | TEMPERATURE: 14 F | OXYGEN SATURATION: 96 % | DIASTOLIC BLOOD PRESSURE: 66 MMHG | RESPIRATION RATE: 16 BRPM

## 2023-12-22 DIAGNOSIS — I35.0 AORTIC STENOSIS, SEVERE: Primary | ICD-10-CM

## 2023-12-22 DIAGNOSIS — I45.4 IVCD (INTRAVENTRICULAR CONDUCTION DEFECT): ICD-10-CM

## 2023-12-22 LAB
ANION GAP SERPL CALC-SCNC: 8 MMOL/L (ref 8–16)
APTT PPP: 47.9 SEC (ref 21–32)
APTT PPP: 56 SEC (ref 21–32)
APTT PPP: 76.7 SEC (ref 21–32)
ASCENDING AORTA: 2.89 CM
AV INDEX (PROSTH): 0.53
AV MEAN GRADIENT: 19 MMHG
AV PEAK GRADIENT: 35 MMHG
AV VALVE AREA BY VELOCITY RATIO: 1.27 CM²
AV VALVE AREA: 1.73 CM²
AV VELOCITY RATIO: 0.39
BASOPHILS # BLD AUTO: 0.03 K/UL (ref 0–0.2)
BASOPHILS # BLD AUTO: 0.03 K/UL (ref 0–0.2)
BASOPHILS NFR BLD: 0.9 % (ref 0–1.9)
BASOPHILS NFR BLD: 0.9 % (ref 0–1.9)
BSA FOR ECHO PROCEDURE: 1.93 M2
BUN SERPL-MCNC: 18 MG/DL (ref 8–23)
CALCIUM SERPL-MCNC: 8.6 MG/DL (ref 8.7–10.5)
CHLORIDE SERPL-SCNC: 104 MMOL/L (ref 95–110)
CO2 SERPL-SCNC: 22 MMOL/L (ref 23–29)
CREAT SERPL-MCNC: 0.8 MG/DL (ref 0.5–1.4)
CV ECHO LV RWT: 0.33 CM
DIFFERENTIAL METHOD: ABNORMAL
DIFFERENTIAL METHOD: ABNORMAL
DOP CALC AO PEAK VEL: 2.96 M/S
DOP CALC AO VTI: 57.1 CM
DOP CALC LVOT AREA: 3.3 CM2
DOP CALC LVOT DIAMETER: 2.05 CM
DOP CALC LVOT PEAK VEL: 1.14 M/S
DOP CALC LVOT STROKE VOLUME: 98.97 CM3
DOP CALCLVOT PEAK VEL VTI: 30 CM
E WAVE DECELERATION TIME: 264.44 MSEC
E/A RATIO: 1.16
E/E' RATIO: 16.67 M/S
ECHO LV POSTERIOR WALL: 0.85 CM (ref 0.6–1.1)
EOSINOPHIL # BLD AUTO: 0.2 K/UL (ref 0–0.5)
EOSINOPHIL # BLD AUTO: 0.2 K/UL (ref 0–0.5)
EOSINOPHIL NFR BLD: 5.6 % (ref 0–8)
EOSINOPHIL NFR BLD: 5.8 % (ref 0–8)
ERYTHROCYTE [DISTWIDTH] IN BLOOD BY AUTOMATED COUNT: 14.7 % (ref 11.5–14.5)
ERYTHROCYTE [DISTWIDTH] IN BLOOD BY AUTOMATED COUNT: 14.8 % (ref 11.5–14.5)
EST. GFR  (NO RACE VARIABLE): >60 ML/MIN/1.73 M^2
FRACTIONAL SHORTENING: 36 % (ref 28–44)
GLUCOSE SERPL-MCNC: 101 MG/DL (ref 70–110)
HCT VFR BLD AUTO: 23.4 % (ref 40–54)
HCT VFR BLD AUTO: 23.7 % (ref 40–54)
HGB BLD-MCNC: 8 G/DL (ref 14–18)
HGB BLD-MCNC: 8.1 G/DL (ref 14–18)
IMM GRANULOCYTES # BLD AUTO: 0 K/UL (ref 0–0.04)
IMM GRANULOCYTES # BLD AUTO: 0.01 K/UL (ref 0–0.04)
IMM GRANULOCYTES NFR BLD AUTO: 0 % (ref 0–0.5)
IMM GRANULOCYTES NFR BLD AUTO: 0.3 % (ref 0–0.5)
INR PPP: 1.1 (ref 0.8–1.2)
INTERVENTRICULAR SEPTUM: 0.82 CM (ref 0.6–1.1)
LA MAJOR: 5.98 CM
LA MINOR: 6.15 CM
LA WIDTH: 4.46 CM
LEFT ATRIUM SIZE: 5.24 CM
LEFT ATRIUM VOLUME INDEX MOD: 42.6 ML/M2
LEFT ATRIUM VOLUME INDEX: 62.4 ML/M2
LEFT ATRIUM VOLUME MOD: 82.24 CM3
LEFT ATRIUM VOLUME: 120.46 CM3
LEFT INTERNAL DIMENSION IN SYSTOLE: 3.24 CM (ref 2.1–4)
LEFT VENTRICLE DIASTOLIC VOLUME INDEX: 63.66 ML/M2
LEFT VENTRICLE DIASTOLIC VOLUME: 122.86 ML
LEFT VENTRICLE MASS INDEX: 76 G/M2
LEFT VENTRICLE SYSTOLIC VOLUME INDEX: 21.8 ML/M2
LEFT VENTRICLE SYSTOLIC VOLUME: 42.07 ML
LEFT VENTRICULAR INTERNAL DIMENSION IN DIASTOLE: 5.08 CM (ref 3.5–6)
LEFT VENTRICULAR MASS: 147.41 G
LV LATERAL E/E' RATIO: 15.63 M/S
LV SEPTAL E/E' RATIO: 17.86 M/S
LYMPHOCYTES # BLD AUTO: 0.4 K/UL (ref 1–4.8)
LYMPHOCYTES # BLD AUTO: 0.4 K/UL (ref 1–4.8)
LYMPHOCYTES NFR BLD: 11.1 % (ref 18–48)
LYMPHOCYTES NFR BLD: 12.8 % (ref 18–48)
MCH RBC QN AUTO: 26.3 PG (ref 27–31)
MCH RBC QN AUTO: 26.4 PG (ref 27–31)
MCHC RBC AUTO-ENTMCNC: 34.2 G/DL (ref 32–36)
MCHC RBC AUTO-ENTMCNC: 34.2 G/DL (ref 32–36)
MCV RBC AUTO: 77 FL (ref 82–98)
MCV RBC AUTO: 77 FL (ref 82–98)
MONOCYTES # BLD AUTO: 0.3 K/UL (ref 0.3–1)
MONOCYTES # BLD AUTO: 0.4 K/UL (ref 0.3–1)
MONOCYTES NFR BLD: 10 % (ref 4–15)
MONOCYTES NFR BLD: 10.2 % (ref 4–15)
MV A" WAVE DURATION": 9.13 MSEC
MV PEAK A VEL: 1.08 M/S
MV PEAK E VEL: 1.25 M/S
MV STENOSIS PRESSURE HALF TIME: 76.69 MS
MV VALVE AREA P 1/2 METHOD: 2.87 CM2
NEUTROPHILS # BLD AUTO: 2.3 K/UL (ref 1.8–7.7)
NEUTROPHILS # BLD AUTO: 2.5 K/UL (ref 1.8–7.7)
NEUTROPHILS NFR BLD: 70.5 % (ref 38–73)
NEUTROPHILS NFR BLD: 71.9 % (ref 38–73)
NRBC BLD-RTO: 0 /100 WBC
NRBC BLD-RTO: 0 /100 WBC
PISA TR MAX VEL: 2.27 M/S
PLATELET # BLD AUTO: 70 K/UL (ref 150–450)
PLATELET # BLD AUTO: 71 K/UL (ref 150–450)
PMV BLD AUTO: 9.3 FL (ref 9.2–12.9)
PMV BLD AUTO: 9.6 FL (ref 9.2–12.9)
POCT GLUCOSE: 121 MG/DL (ref 70–110)
POTASSIUM SERPL-SCNC: 4.6 MMOL/L (ref 3.5–5.1)
PROTHROMBIN TIME: 11.9 SEC (ref 9–12.5)
PULM VEIN S/D RATIO: 1.75
PV PEAK D VEL: 0.44 M/S
PV PEAK S VEL: 0.77 M/S
RA MAJOR: 5.84 CM
RA PRESSURE ESTIMATED: 8 MMHG
RA WIDTH: 3.94 CM
RBC # BLD AUTO: 3.04 M/UL (ref 4.6–6.2)
RBC # BLD AUTO: 3.07 M/UL (ref 4.6–6.2)
RIGHT VENTRICULAR END-DIASTOLIC DIMENSION: 4.77 CM
RV TB RVSP: 10 MMHG
SINUS: 2.54 CM
SODIUM SERPL-SCNC: 134 MMOL/L (ref 136–145)
STJ: 2.24 CM
TDI LATERAL: 0.08 M/S
TDI SEPTAL: 0.07 M/S
TDI: 0.08 M/S
TR MAX PG: 21 MMHG
TRICUSPID ANNULAR PLANE SYSTOLIC EXCURSION: 1.58 CM
TV REST PULMONARY ARTERY PRESSURE: 29 MMHG
WBC # BLD AUTO: 3.29 K/UL (ref 3.9–12.7)
WBC # BLD AUTO: 3.42 K/UL (ref 3.9–12.7)
Z-SCORE OF LEFT VENTRICULAR DIMENSION IN END DIASTOLE: -0.73
Z-SCORE OF LEFT VENTRICULAR DIMENSION IN END SYSTOLE: -0.29

## 2023-12-22 PROCEDURE — 25000003 PHARM REV CODE 250

## 2023-12-22 PROCEDURE — 94761 N-INVAS EAR/PLS OXIMETRY MLT: CPT

## 2023-12-22 PROCEDURE — 85025 COMPLETE CBC W/AUTO DIFF WBC: CPT | Mod: 91 | Performed by: STUDENT IN AN ORGANIZED HEALTH CARE EDUCATION/TRAINING PROGRAM

## 2023-12-22 PROCEDURE — 85730 THROMBOPLASTIN TIME PARTIAL: CPT | Mod: 91 | Performed by: INTERNAL MEDICINE

## 2023-12-22 PROCEDURE — 85730 THROMBOPLASTIN TIME PARTIAL: CPT | Mod: 91 | Performed by: STUDENT IN AN ORGANIZED HEALTH CARE EDUCATION/TRAINING PROGRAM

## 2023-12-22 PROCEDURE — 93010 ELECTROCARDIOGRAM REPORT: CPT | Mod: ,,, | Performed by: INTERNAL MEDICINE

## 2023-12-22 PROCEDURE — 93005 ELECTROCARDIOGRAM TRACING: CPT

## 2023-12-22 PROCEDURE — 93010 EKG 12-LEAD: ICD-10-PCS | Mod: ,,, | Performed by: INTERNAL MEDICINE

## 2023-12-22 PROCEDURE — 93270 REMOTE 30 DAY ECG REV/REPORT: CPT

## 2023-12-22 PROCEDURE — 93272 CARDIAC EVENT MONITOR (CUPID ONLY): ICD-10-PCS | Mod: ,,, | Performed by: STUDENT IN AN ORGANIZED HEALTH CARE EDUCATION/TRAINING PROGRAM

## 2023-12-22 PROCEDURE — 80048 BASIC METABOLIC PNL TOTAL CA: CPT | Performed by: STUDENT IN AN ORGANIZED HEALTH CARE EDUCATION/TRAINING PROGRAM

## 2023-12-22 PROCEDURE — 99900035 HC TECH TIME PER 15 MIN (STAT)

## 2023-12-22 PROCEDURE — 93271 ECG/MONITORING AND ANALYSIS: CPT

## 2023-12-22 PROCEDURE — 99223 1ST HOSP IP/OBS HIGH 75: CPT | Mod: ,,, | Performed by: STUDENT IN AN ORGANIZED HEALTH CARE EDUCATION/TRAINING PROGRAM

## 2023-12-22 PROCEDURE — 93272 ECG/REVIEW INTERPRET ONLY: CPT | Mod: ,,, | Performed by: STUDENT IN AN ORGANIZED HEALTH CARE EDUCATION/TRAINING PROGRAM

## 2023-12-22 PROCEDURE — 85730 THROMBOPLASTIN TIME PARTIAL: CPT | Performed by: INTERNAL MEDICINE

## 2023-12-22 PROCEDURE — 85025 COMPLETE CBC W/AUTO DIFF WBC: CPT | Performed by: STUDENT IN AN ORGANIZED HEALTH CARE EDUCATION/TRAINING PROGRAM

## 2023-12-22 PROCEDURE — 99239 PR HOSPITAL DISCHARGE DAY,>30 MIN: ICD-10-PCS | Mod: ,,, | Performed by: INTERNAL MEDICINE

## 2023-12-22 PROCEDURE — 99223 PR INITIAL HOSPITAL CARE,LEVL III: ICD-10-PCS | Mod: ,,, | Performed by: STUDENT IN AN ORGANIZED HEALTH CARE EDUCATION/TRAINING PROGRAM

## 2023-12-22 PROCEDURE — 63600175 PHARM REV CODE 636 W HCPCS

## 2023-12-22 PROCEDURE — 99239 HOSP IP/OBS DSCHRG MGMT >30: CPT | Mod: ,,, | Performed by: INTERNAL MEDICINE

## 2023-12-22 PROCEDURE — 85610 PROTHROMBIN TIME: CPT

## 2023-12-22 PROCEDURE — 25000003 PHARM REV CODE 250: Performed by: STUDENT IN AN ORGANIZED HEALTH CARE EDUCATION/TRAINING PROGRAM

## 2023-12-22 RX ADMIN — POLYETHYLENE GLYCOL 3350 17 G: 17 POWDER, FOR SOLUTION ORAL at 08:12

## 2023-12-22 RX ADMIN — MORPHINE SULFATE 2 MG: 2 INJECTION, SOLUTION INTRAMUSCULAR; INTRAVENOUS at 12:12

## 2023-12-22 RX ADMIN — FUROSEMIDE 40 MG: 40 TABLET ORAL at 08:12

## 2023-12-22 RX ADMIN — SPIRONOLACTONE 25 MG: 25 TABLET ORAL at 08:12

## 2023-12-22 RX ADMIN — LACTULOSE 10 G: 20 SOLUTION ORAL at 08:12

## 2023-12-22 RX ADMIN — SENNOSIDES AND DOCUSATE SODIUM 1 TABLET: 8.6; 5 TABLET ORAL at 08:12

## 2023-12-22 RX ADMIN — ASPIRIN 81 MG CHEWABLE TABLET 81 MG: 81 TABLET CHEWABLE at 08:12

## 2023-12-22 RX ADMIN — PANTOPRAZOLE SODIUM 40 MG: 40 TABLET, DELAYED RELEASE ORAL at 08:12

## 2023-12-22 NOTE — TELEPHONE ENCOUNTER
Pt given TAVR post instructions, Antibiotic drug card and instructed to contact the office for f/u appointment.

## 2023-12-22 NOTE — SUBJECTIVE & OBJECTIVE
Past Medical History:   Diagnosis Date    Arthritis     Cataract     Coronary artery disease     Digestive disorder     Encounter for blood transfusion 04/13/2023    Heart disease     Hypertension     Type 2 diabetes mellitus without complication, without long-term current use of insulin 12/30/2022    Vision loss        Past Surgical History:   Procedure Laterality Date    AORTOGRAPHY N/A 12/21/2023    Procedure: AORTOGRAM;  Surgeon: Reinier Haley MD;  Location: [facility] CATH LAB;  Service: Cardiology;  Laterality: N/A;    CARDIAC CATH COSURGEON N/A 12/21/2023    Procedure: Cardiac Cath Cosurgeon;  Surgeon: Vinnie Lentz MD;  Location: [facility] CATH LAB;  Service: Cardiovascular;  Laterality: N/A;    CARDIAC SURGERY      CATARACT EXTRACTION Bilateral     CORNEAL TRANSPLANT Left 03/29/2021    CORONARY BYPASS GRAFT ANGIOGRAPHY  12/21/2023    Procedure: Bypass graft study;  Surgeon: Reinier Haley MD;  Location: [facility] CATH LAB;  Service: Cardiology;;    L cubital tunnel release      open heart surg      PENETRATING KERATOPLASTY Left 3/29/2021    Procedure: KERATOPLASTY, PENETRATING;  Surgeon: Jerome Connors MD;  Location: [facility] OR;  Service: Ophthalmology;  Laterality: Left;    RETINAL DETACHMENT SURGERY      surgery keratectomy      TRANSCATHETER AORTIC VALVE REPLACEMENT (TAVR) N/A 12/21/2023    Procedure: REPLACEMENT, AORTIC VALVE, TRANSCATHETER (TAVR);  Surgeon: Reinier Haley MD;  Location: [facility] CATH LAB;  Service: Cardiology;  Laterality: N/A;    TRANSCATHETER AORTIC VALVE REPLACEMENT (TAVR)  12/21/2023    Procedure: REPLACEMENT, AORTIC VALVE, TRANSCATHETER (TAVR);  Surgeon: Vinnie Lentz MD;  Location: [facility] CATH LAB;  Service: Cardiovascular;;    TRANSESOPHAGEAL ECHOCARDIOGRAPHY N/A 7/19/2023    Procedure: ECHOCARDIOGRAM, TRANSESOPHAGEAL;  Surgeon: Jaswant Hercules MD;  Location: [facility] CATH LAB;  Service: Cardiology;  Laterality: N/A;       Review of patient's allergies indicates:  No  Known Allergies    No current facility-administered medications on file prior to encounter.     Current Outpatient Medications on File Prior to Encounter   Medication Sig    ascorbic acid, vitamin C, (VITAMIN C) 500 MG tablet Take 500 mg by mouth once daily.    cyanocobalamin (VITAMIN B-12) 1000 MCG tablet Take 5,000 mcg by mouth once daily.    ergocalciferol, vitamin D2, (VITAMIN D ORAL) Take 1 tablet by mouth once daily.    lactulose (CHRONULAC) 10 gram/15 mL solution Take 10 g by mouth 2 (two) times daily.    omega-3 fatty acids/fish oil (FISH OIL-OMEGA-3 FATTY ACIDS) 300-1,000 mg capsule Take 1 capsule by mouth once daily.    omeprazole (PRILOSEC) 10 MG capsule Take 10 mg by mouth once daily.    spironolactone (ALDACTONE) 25 MG tablet Take 1 tablet (25 mg total) by mouth once daily.    calcium carbonate/vitamin D3 (CALCIUM 600 + D,3, ORAL) Take 1 tablet by mouth Daily.    furosemide (LASIX) 40 MG tablet Take 1 tablet (40 mg total) by mouth once daily.    prednisoLONE acetate (PRED FORTE) 1 % DrpS Place 1 drop into the left eye 4 (four) times daily.     Family History       Problem Relation (Age of Onset)    Heart disease Father    No Known Problems Mother          Tobacco Use    Smoking status: Former     Types: Cigarettes    Smokeless tobacco: Never    Tobacco comments:     Quit smoking 17      years ago   Substance and Sexual Activity    Alcohol use: Not Currently    Drug use: Never    Sexual activity: Yes     Partners: Female     Review of Systems   Constitutional: Negative for fever and malaise/fatigue.   Eyes:  Negative for blurred vision and pain.   Cardiovascular:  Negative for chest pain, dyspnea on exertion, leg swelling, orthopnea, palpitations and paroxysmal nocturnal dyspnea.   Respiratory:  Negative for cough, shortness of breath, sputum production and wheezing.    Hematologic/Lymphatic: Negative for adenopathy and bleeding problem.   Skin:  Negative for rash.   Musculoskeletal:  Negative for back  pain and neck pain.   Gastrointestinal:  Negative for abdominal pain, constipation, diarrhea, nausea and vomiting.   Genitourinary:  Negative for dysuria.   Neurological:  Negative for dizziness, headaches, light-headedness and weakness.     Objective:     Vital Signs (Most Recent):  Temp: 97.6 °F (36.4 °C) (12/22/23 0720)  Pulse: 63 (12/22/23 1021)  Resp: 18 (12/22/23 0730)  BP: 134/63 (12/22/23 0720)  SpO2: 97 % (12/22/23 0730) Vital Signs (24h Range):  Temp:  [97 °F (36.1 °C)-98.3 °F (36.8 °C)] 97.6 °F (36.4 °C)  Pulse:  [45-64] 63  Resp:  [12-18] 18  SpO2:  [91 %-100 %] 97 %  BP: ()/(48-68) 134/63  Arterial Line BP: ()/(35-61) 136/61       Weight: 76.1 kg (167 lb 12.3 oz)  Body mass index is 24.07 kg/m².    SpO2: 97 %        Physical Exam  Constitutional:       General: He is not in acute distress.     Appearance: Normal appearance. He is not ill-appearing, toxic-appearing or diaphoretic.   HENT:      Head: Normocephalic and atraumatic.      Nose: Nose normal.   Eyes:      Extraocular Movements: Extraocular movements intact.      Pupils: Pupils are equal, round, and reactive to light.   Cardiovascular:      Rate and Rhythm: Normal rate and regular rhythm.      Heart sounds:      No friction rub. No gallop.      Comments: TVP in place, currently not pacing  Pulmonary:      Effort: Pulmonary effort is normal. No respiratory distress.      Breath sounds: Normal breath sounds. No wheezing or rales.   Abdominal:      General: Abdomen is flat. There is no distension.      Palpations: Abdomen is soft. There is no mass.      Tenderness: There is no abdominal tenderness.   Musculoskeletal:         General: No swelling. Normal range of motion.      Cervical back: Normal range of motion. No rigidity.      Right lower leg: No edema.      Left lower leg: No edema.   Skin:     General: Skin is warm and dry.      Coloration: Skin is not jaundiced.      Findings: No bruising.   Neurological:      General: No focal  deficit present.      Mental Status: He is alert and oriented to person, place, and time.      Cranial Nerves: No cranial nerve deficit.      Motor: No weakness.            Significant Labs: EP:   Recent Labs   Lab 12/21/23  0625 12/21/23  1817 12/22/23  0522   * 131* 134*   K 4.5 4.6 4.6    101 104   CO2 22* 24 22*   * 140* 101   BUN 21 18 18   CREATININE 0.9 0.8 0.8   CALCIUM 9.2 8.9 8.6*   PROT 7.2 6.7  --    ALBUMIN 3.4* 3.3*  --    BILITOT 1.2* 0.8  --    ALKPHOS 100 99  --    AST 35 34  --    ALT 23 21  --    ANIONGAP 6* 6* 8   WBC 3.15* 3.17* 3.42*  3.29*   HGB 8.6* 8.4* 8.0*  8.1*   HCT 25.7* 25.7* 23.4*  23.7*   PLT 97* 80* 70*  71*   INR 1.1 1.1 1.1       Significant Imaging: EKG: see HPI

## 2023-12-22 NOTE — ASSESSMENT & PLAN NOTE
Mr. Asif Nolen is a 68 y.o. patient with a past medical history of DM, HTN, CAD, s/p CABG (LIMA to LAD) and AVR [25 mm Perimount valve (2016 at [facility])] s/p TAVR (12/21/23) 26mm Arevalo Katie S3 bioprosthesis  w/ Tera and Trishael, liver cirrhosis with grade 4-5 varices with recent banding in 01/2023 referred to [facility] for AV replacement, performed yesterday. According to the patient, he started to feel intermittent episodes of dyspnea on exertion, being able to walk half block. No lower extremity edema, syncope or palpitations. TVP placed prior to TAVR in [facility]. EKG early this AM shows sinus jenna with some ventricular paced complexs. EKG from 0948 Sinus jenna, no paced beats, possible IVCD, QRS 104ms, slightly wider from prior of 98ms. Patient has no known history of arrhythmia. Patient is HDS and asymptomatic. Ep consulted for Possible HB    Plan  Discussed Case with Staff  New EKG changes likely represent IVCD.   Clear to Pull TVP  Recommend 30 day Holter monitor at discharge.

## 2023-12-22 NOTE — PLAN OF CARE
[facility] - Cardiology Stepdown  Discharge Final Note    Primary Care Provider: No, Primary Doctor    Expected Discharge Date: 12/22/2023      Patient cleared for discharge from case management standpoint.  Hospital follow ups placed on AVS.    Final Discharge Note (most recent)       Final Note - 12/22/23 1010          Final Note    Assessment Type Final Discharge Note     Anticipated Discharge Disposition Home or Self Care     What phone number can be called within the next 1-3 days to see how you are doing after discharge? [phone]     Hospital Resources/Appts/Education Provided Provided patient/caregiver with written discharge plan information;Provided education on problems/symptoms using teachback;Appointments scheduled and added to AVS        Post-Acute Status    Post-Acute Authorization Other     Other Status No Post-Acute Service Needs     Discharge Delays None known at this time                     Important Message from Medicare             Contact Info       [facility]  [address] [address]  [phone]       Next Steps: Go on 1/19/2024    Instructions: at 2:30pm    MD Antionette Morales - Cardiology  [address] [place] [address]  [facility] [address]  [phone]       Next Steps: Follow up    Instructions: office to contact patient to schedule hospital follow up

## 2023-12-22 NOTE — HOSPITAL COURSE
S/p TAVR. Hemodynamically stable. EKG with sinus bradycardia, no evidence of sustained heart blocks. 30-day event monitor as outpatient. Medically ready for discharge.

## 2023-12-22 NOTE — DISCHARGE SUMMARY
[facility] - Cardiology Stepdown  Cardiology  Discharge Summary      Patient Name: Asif Nolen  MRN: [id number]  Admission Date: 12/21/2023  Hospital Length of Stay: 1 days  Discharge Date and Time:  12/22/2023 4:07 PM  Attending Physician: Khadijah att. providers found    Discharging Provider: Shawn Esquivel MD  Primary Care Physician: Khadijah, Primary Doctor    HPI:   Mr. Asif Nolen is a 68 y.o. patient with a past medical history of DM, HTN, CAD, s/p CABG (LIMA to LAD) and AVR with 25 mm Perimount valve (2016 at ), liver cirrhosis with grade 4-5 varices with recent banding in 01/2023. According to the patient, he started to feel intermittent episodes of dyspnea on exertion, being able to walk half block. No lower extremity edema, syncope or palpitations.      Patient with prior notice of degeneration of bioprosthetic valve at [place] with severe AR (NYHA Class III sx) and stenosis with previous TAVR aborted due to low intraprocedural gradient across the aortic valve. Referred by Dr Granados for evaluation of failing bioprosthetic valve.      The patient has undergone the following TAVR work-up:   ECHO (Date 11/14/2023): AYAH= 1.39 cm2, MG= 26 mmHg, Peak Akira= 3.24 m/s, EF= 65%. DI: 0.42  LHC (Date ): non-obstructive coronary disease (Need to get records from OSH)   STS: 4%   Frailty: 1/4   Iliacs are >7 on R and > 6.5 on L   LVOT area by CTA is 3.76 cm2 (26.6 mm X 20.2 mm) and Avg Diameter is 21.9 per Dr Haley  Incidental findings on CT: Cirrhotic liver morphology. Sequela of portal venous hypertension including splenomegaly and upper abdominal venous collateral vessel formation. Avascular necrosis of the femoral heads.  CT Surgery risk assessment: High risk, per Dr Galan due to esophageal varices, redo sternotomy, thrombocytopenia  Rhythm issues: Afib  PFTs: deferred   KCCQ/5 meter walk: 3.9  Comorbidities: Afib, liver cirrhosis, prior sternotomy, HTN    Admitted to Cardiac Stepdown Unit for further management.      Procedure(s) (LRB):  REPLACEMENT, AORTIC VALVE, TRANSCATHETER (TAVR) (N/A)  Cardiac Cath Cosurgeon (N/A)  REPLACEMENT, AORTIC VALVE, TRANSCATHETER (TAVR)  Bypass graft study  AORTOGRAM (N/A)     Indwelling Lines/Drains at time of discharge:  Lines/Drains/Airways       None                   Hospital Course:  S/p TAVR. Hemodynamically stable. EKG with sinus bradycardia, no evidence of sustained heart blocks. 30-day event monitor as outpatient. Medically ready for discharge.     Physical Exam  Vitals and nursing note reviewed.   Cardiovascular:      Rate and Rhythm: Normal rate and regular rhythm.      Heart sounds: No murmur heard.     No friction rub.   Pulmonary:      Effort: Pulmonary effort is normal.   Musculoskeletal:      Right lower leg: No edema.      Left lower leg: No edema.   Skin:     Capillary Refill: Capillary refill takes 2 to 3 seconds.   Neurological:      General: No focal deficit present.      Mental Status: He is alert and oriented to person, place, and time.          Goals of Care Treatment Preferences:  Code Status: Full Code      Consults:   Consults (From admission, onward)          Status Ordering Provider     Inpatient consult to Electrophysiology  Once        Provider:  (Not yet assigned)    Completed GOLDY PULLIAM            Significant Diagnostic Studies: N/A    Pending Diagnostic Studies:       Procedure Component Value Units Date/Time    EKG 12-LEAD [[id number]]     Order Status: Sent Lab Status: No result             Final Active Diagnoses:    Diagnosis Date Noted POA    PRINCIPAL PROBLEM:  S/P TAVR (transcatheter aortic valve replacement) [Z95.2] 12/30/2022 Not Applicable    Aortic stenosis [I35.0] 12/21/2023 Yes    Esophageal varices without bleeding [I85.00] 04/18/2023 Yes    IVCD (intraventricular conduction defect) [I45.4] 12/22/2023 No    Peptic ulcer disease [K27.9] 12/30/2022 Yes    Type 2 diabetes mellitus without complication, without long-term current use of insulin  [E11.9] 12/30/2022 Yes      Problems Resolved During this Admission:     Cardiac/Vascular  * S/P TAVR (transcatheter aortic valve replacement)  - s/p TAVR without new episodes of shortness of breath or chest pain. Will continue to monitor rhythm and echo post procedure. No episodes of heart block.     Plan   - medically ready for discharge  -resume home lasix 40 daily  -resume home spironolactone 25 daily   - aspirin 81mg daily   - follow up with cardiology outpatient\  - 30-day event monitor as outpatient     Aortic stenosis  See transcatheter aortic valve placement     Endocrine  Type 2 diabetes mellitus without complication, without long-term current use of insulin  - closely monitor BG as inpatient. No need at the moment for insulin management.     GI  Peptic ulcer disease  - continue at home pantoprazole     Esophageal varices without bleeding  - continue home lactulose        Discharged Condition: good    Disposition: Home or Self Care    Follow Up:   Follow-up Information       Casa Valerio MD. Go on 1/19/2024.    Why: at 2:30pm  Contact information:  [facility]  [address] [id number] [phone]             Leah Granados MD Follow up.    Why: office to contact patient to schedule hospital follow up  Contact information:  [facility]  [address] [place] [address]  [facility] [id number] [phone]                         Patient Instructions:      Cardiac event monitor   Standing Status: Future Number of Occurrences: 1 Standing Exp. Date: 12/22/24     Order Specific Question Answer Comments   Cardiac Event Monitor Auto Trigger    Release to patient Immediate      Medications:  Reconciled Home Medications:      Medication List        CONTINUE taking these medications      CALCIUM 600 + D(3) ORAL  Take 1 tablet by mouth Daily.     cyanocobalamin 1000 MCG tablet  Commonly known as: VITAMIN B-12  Take 5,000 mcg by mouth once daily.     fish oil-omega-3 fatty  acids 300-1,000 mg capsule  Take 1 capsule by mouth once daily.     furosemide 40 MG tablet  Commonly known as: LASIX  Take 1 tablet (40 mg total) by mouth once daily.     lactulose 10 gram/15 mL solution  Commonly known as: CHRONULAC  Take 10 g by mouth 2 (two) times daily.     omeprazole 10 MG capsule  Commonly known as: PRILOSEC  Take 10 mg by mouth once daily.     prednisoLONE acetate 1 % Drps  Commonly known as: PRED FORTE  Place 1 drop into the left eye 4 (four) times daily.     spironolactone 25 MG tablet  Commonly known as: ALDACTONE  Take 1 tablet (25 mg total) by mouth once daily.     VITAMIN C 500 MG tablet  Generic drug: ascorbic acid (vitamin C)  Take 500 mg by mouth once daily.     VITAMIN D ORAL  Take 1 tablet by mouth once daily.              Time spent on the discharge of patient: 60 minutes    Shawn Esquivel MD  Cardiology  [facility] - Cardiology Stepdown

## 2023-12-22 NOTE — PLAN OF CARE
Patient is ready for discharge. Patient stable alert and oriented. Tele, TPV, and IVs removed. Holter monitor placed by personnel. Heparin d/anand. Bilateral groin site open to air, no bleeding, no hematoma. No complaints of pain. Discussed discharge plan. Reviewed medications and side effects, appointments, and answered questions with patient and spouse. Awaiting escort for safe transport off the unit.

## 2023-12-22 NOTE — PLAN OF CARE
Plan of care discussed with patient and spouse. Patient is free of fall/trauma/injury. Denies CP or SOB. PRN IVP morphine administered for complains of pain. TVP in place. NS infusing at 10mL/hr through RIJ Cordis. All questions addressed. Will continue to monitor.

## 2023-12-22 NOTE — HPI
Mr. Asif Nolen is a 68 y.o. patient with a past medical history of DM, HTN, CAD, s/p CABG (LIMA to LAD) and AVR [25 mm Perimount valve (2016 at [facility])] s/p TAVR (12/21/23) 26mm Arevalo Katie S3 bioprosthesis  w/ Tera and Trishael, liver cirrhosis with grade 4-5 varices with recent banding in 01/2023 referred to [facility] for [place] replacement, performed yesterday. According to the patient, he started to feel intermittent episodes of dyspnea on exertion, being able to walk half block. No lower extremity edema, syncope or palpitations.     TVP placed prior to TAVR in [facility]. EKG shows sinus jenna with frequent ventricular paced complexs. EKG from 0948 Sinus jenna, no paced beats, possible IVCD, QRS 104ms, slightly wider from prior of 98ms. Patient has no known history of arrhythmia, EKG from 1/23 had normal QRS.     Ep consulted for Possible HB

## 2023-12-22 NOTE — ASSESSMENT & PLAN NOTE
- s/p TAVR without new episodes of shortness of breath or chest pain. Will continue to monitor rhythm and echo post procedure. No episodes of heart block.     Plan   - medically ready for discharge  -resume home lasix 40 daily  -resume home spironolactone 25 daily   - aspirin 81mg daily   - follow up with cardiology outpatient\  - 30-day event monitor as outpatient

## 2023-12-22 NOTE — PLAN OF CARE
Plan of care discussed with patient. Patient is free of fall/trauma/injury. Denies CP, SOB, or pain/discomfort. S/p TAVR, TVP in place. Possible /dc today. All questions addressed. Will continue to monitor

## 2023-12-22 NOTE — CONSULTS
[facility] - Cardiology Stepdown  Cardiac Electrophysiology  Consult Note    Admission Date: 12/21/2023  Code Status: Full Code   Attending Provider: Damian Maldonado MD  Consulting Provider: Sterling Obrien DO  Principal Problem:S/P TAVR (transcatheter aortic valve replacement)    Inpatient consult to Electrophysiology  Consult performed by: Sterling Obrien DO  Consult ordered by: Matthias Triplett MD  Reason for consult: Possible HB        Subjective:     Chief Complaint:  SOB     HPI:   Mr. Asif Nolen is a 68 y.o. patient with a past medical history of DM, HTN, CAD, s/p CABG (LIMA to LAD) and AVR [25 mm Perimount valve (2016 at )] s/p TAVR (12/21/23) 26mm Arevalo Katie S3 bioprosthesis  w/ Tera and Mary Carmen, liver cirrhosis with grade 4-5 varices with recent banding in 01/2023 referred to [facility] for AV replacement, performed yesterday. According to the patient, he started to feel intermittent episodes of dyspnea on exertion, being able to walk half block. No lower extremity edema, syncope or palpitations.     TVP placed prior to TAVR in [facility]. EKG shows sinus jenna with frequent ventricular paced complexs. EKG from 0948 Sinus jenna, no paced beats, possible IVCD, QRS 104ms, slightly wider from prior of 98ms. Patient has no known history of arrhythmia, EKG from 1/23 had normal QRS.     Ep consulted for Possible HB    Past Medical History:   Diagnosis Date    Arthritis     Cataract     Coronary artery disease     Digestive disorder     Encounter for blood transfusion 04/13/2023    Heart disease     Hypertension     Type 2 diabetes mellitus without complication, without long-term current use of insulin 12/30/2022    Vision loss        Past Surgical History:   Procedure Laterality Date    AORTOGRAPHY N/A 12/21/2023    Procedure: AORTOGRAM;  Surgeon: Reinier Haley MD;  Location: [facility] CATH LAB;  Service: Cardiology;  Laterality: N/A;    CARDIAC CATH COSURGEON N/A 12/21/2023    Procedure: Cardiac Cath Cosurgeon;   Surgeon: Vinnie Lentz MD;  Location: [facility] CATH LAB;  Service: Cardiovascular;  Laterality: N/A;    CARDIAC SURGERY      CATARACT EXTRACTION Bilateral     CORNEAL TRANSPLANT Left 03/29/2021    CORONARY BYPASS GRAFT ANGIOGRAPHY  12/21/2023    Procedure: Bypass graft study;  Surgeon: Reinier Haley MD;  Location: [facility] CATH LAB;  Service: Cardiology;;    L cubital tunnel release      open heart surg      PENETRATING KERATOPLASTY Left 3/29/2021    Procedure: KERATOPLASTY, PENETRATING;  Surgeon: Jerome Connors MD;  [facility];  Service: Ophthalmology;  Laterality: Left;    RETINAL DETACHMENT SURGERY      surgery keratectomy      TRANSCATHETER AORTIC VALVE REPLACEMENT (TAVR) N/A 12/21/2023    Procedure: REPLACEMENT, AORTIC VALVE, TRANSCATHETER (TAVR);  Surgeon: Reinier Haley MD;  Location: [facility] CATH LAB;  Service: Cardiology;  Laterality: N/A;    TRANSCATHETER AORTIC VALVE REPLACEMENT (TAVR)  12/21/2023    Procedure: REPLACEMENT, AORTIC VALVE, TRANSCATHETER (TAVR);  Surgeon: Vinnie Lentz MD;  Location: [facility] CATH LAB;  Service: Cardiovascular;;    TRANSESOPHAGEAL ECHOCARDIOGRAPHY N/A 7/19/2023    Procedure: ECHOCARDIOGRAM, TRANSESOPHAGEAL;  Surgeon: Jaswant Hercules MD;  Location: [facility] CATH LAB;  Service: Cardiology;  Laterality: N/A;       Review of patient's allergies indicates:  No Known Allergies    No current facility-administered medications on file prior to encounter.     Current Outpatient Medications on File Prior to Encounter   Medication Sig    ascorbic acid, vitamin C, (VITAMIN C) 500 MG tablet Take 500 mg by mouth once daily.    cyanocobalamin (VITAMIN B-12) 1000 MCG tablet Take 5,000 mcg by mouth once daily.    ergocalciferol, vitamin D2, (VITAMIN D ORAL) Take 1 tablet by mouth once daily.    lactulose (CHRONULAC) 10 gram/15 mL solution Take 10 g by mouth 2 (two) times daily.    omega-3 fatty acids/fish oil (FISH OIL-OMEGA-3 FATTY ACIDS) 300-1,000 mg capsule Take 1  capsule by mouth once daily.    omeprazole (PRILOSEC) 10 MG capsule Take 10 mg by mouth once daily.    spironolactone (ALDACTONE) 25 MG tablet Take 1 tablet (25 mg total) by mouth once daily.    calcium carbonate/vitamin D3 (CALCIUM 600 + D,3, ORAL) Take 1 tablet by mouth Daily.    furosemide (LASIX) 40 MG tablet Take 1 tablet (40 mg total) by mouth once daily.    prednisoLONE acetate (PRED FORTE) 1 % DrpS Place 1 drop into the left eye 4 (four) times daily.     Family History       Problem Relation (Age of Onset)    Heart disease Father    No Known Problems Mother          Tobacco Use    Smoking status: Former     Types: Cigarettes    Smokeless tobacco: Never    Tobacco comments:     Quit smoking 17      years ago   Substance and Sexual Activity    Alcohol use: Not Currently    Drug use: Never    Sexual activity: Yes     Partners: Female     Review of Systems   Constitutional: Negative for fever and malaise/fatigue.   Eyes:  Negative for blurred vision and pain.   Cardiovascular:  Negative for chest pain, dyspnea on exertion, leg swelling, orthopnea, palpitations and paroxysmal nocturnal dyspnea.   Respiratory:  Negative for cough, shortness of breath, sputum production and wheezing.    Hematologic/Lymphatic: Negative for adenopathy and bleeding problem.   Skin:  Negative for rash.   Musculoskeletal:  Negative for back pain and neck pain.   Gastrointestinal:  Negative for abdominal pain, constipation, diarrhea, nausea and vomiting.   Genitourinary:  Negative for dysuria.   Neurological:  Negative for dizziness, headaches, light-headedness and weakness.     Objective:     Vital Signs (Most Recent):  Temp: 97.6 °F (36.4 °C) (12/22/23 0720)  Pulse: 63 (12/22/23 1021)  Resp: 18 (12/22/23 0730)  BP: 134/63 (12/22/23 0720)  SpO2: 97 % (12/22/23 0730) Vital Signs (24h Range):  Temp:  [97 °F (36.1 °C)-98.3 °F (36.8 °C)] 97.6 °F (36.4 °C)  Pulse:  [45-64] 63  Resp:  [12-18] 18  SpO2:  [91 %-100 %] 97 %  BP: ()/(48-68)  134/63  Arterial Line BP: ()/(35-61) 136/61       Weight: 76.1 kg (167 lb 12.3 oz)  Body mass index is 24.07 kg/m².    SpO2: 97 %        Physical Exam  Constitutional:       General: He is not in acute distress.     Appearance: Normal appearance. He is not ill-appearing, toxic-appearing or diaphoretic.   HENT:      Head: Normocephalic and atraumatic.      Nose: Nose normal.   Eyes:      Extraocular Movements: Extraocular movements intact.      Pupils: Pupils are equal, round, and reactive to light.   Cardiovascular:      Rate and Rhythm: Normal rate and regular rhythm.      Heart sounds:      No friction rub. No gallop.      Comments: TVP in place, currently not pacing  Pulmonary:      Effort: Pulmonary effort is normal. No respiratory distress.      Breath sounds: Normal breath sounds. No wheezing or rales.   Abdominal:      General: Abdomen is flat. There is no distension.      Palpations: Abdomen is soft. There is no mass.      Tenderness: There is no abdominal tenderness.   Musculoskeletal:         General: No swelling. Normal range of motion.      Cervical back: Normal range of motion. No rigidity.      Right lower leg: No edema.      Left lower leg: No edema.   Skin:     General: Skin is warm and dry.      Coloration: Skin is not jaundiced.      Findings: No bruising.   Neurological:      General: No focal deficit present.      Mental Status: He is alert and oriented to person, place, and time.      Cranial Nerves: No cranial nerve deficit.      Motor: No weakness.            Significant Labs: EP:   Recent Labs   Lab 12/21/23  0625 12/21/23  1817 12/22/23  0522   * 131* 134*   K 4.5 4.6 4.6    101 104   CO2 22* 24 22*   * 140* 101   BUN 21 18 18   CREATININE 0.9 0.8 0.8   CALCIUM 9.2 8.9 8.6*   PROT 7.2 6.7  --    ALBUMIN 3.4* 3.3*  --    BILITOT 1.2* 0.8  --    ALKPHOS 100 99  --    AST 35 34  --    ALT 23 21  --    ANIONGAP 6* 6* 8   WBC 3.15* 3.17* 3.42*  3.29*   HGB 8.6* 8.4* 8.0*   8.1*   HCT 25.7* 25.7* 23.4*  23.7*   PLT 97* 80* 70*  71*   INR 1.1 1.1 1.1       Significant Imaging: EKG: see HPI     CHADS2 for A-FIB Stroke Risk  Age?: 65-74 years old  CHF history: No  HTN history: Yes      Assessment and Plan:     IVCD (intraventricular conduction defect)  Mr. Asif Nolen is a 68 y.o. patient with a past medical history of DM, HTN, CAD, s/p CABG (LIMA to LAD) and AVR [25 mm Perimount valve (2016 at [facility])] s/p TAVR (12/21/23) 26mm Arevalo Katie S3 bioprosthesis  w/ Haley and Spindel, liver cirrhosis with grade 4-5 varices with recent banding in 01/2023 [place] [facility] performed yesterday. According to the patient, he started to feel intermittent episodes of dyspnea on exertion, being able to walk half block. No lower extremity edema, syncope or palpitations. TVP placed prior to [facility]AVR [facility]. EKG early this AM shows sinus jenna with some ventricular paced complexs. EKG from 0948 Sinus jenna, no paced beats, possible IVCD, QRS 104ms, slightly wider from prior of 98ms. Patient has no known history of arrhythmia. Patient is HDS and asymptomatic. Ep consulted for Possible HB    Plan  Discussed Case with Staff  New EKG changes likely represent IVCD.   Clear to Pull TVP  Recommend 30 day Holter monitor at discharge.              Thank you for your consult. I will sign off. Please contact us if you have any additional questions.    Sterling Obrien, DO  Cardiac Electrophysiology  [facility] - Cardiology Stepdown

## 2023-12-22 NOTE — SUBJECTIVE & OBJECTIVE
Interval History: no evidence of heart block post TAVR. Hemodynamically stable. Medically ready for discharge.     Review of Systems   Constitutional: Negative for fever and malaise/fatigue.   Eyes:  Negative for blurred vision and pain.   Cardiovascular:  Negative for chest pain, dyspnea on exertion, leg swelling, orthopnea, palpitations and paroxysmal nocturnal dyspnea.   Respiratory:  Negative for cough, shortness of breath, sputum production and wheezing.    Hematologic/Lymphatic: Negative for adenopathy and bleeding problem.   Skin:  Negative for rash.   Musculoskeletal:  Negative for back pain and neck pain.   Gastrointestinal:  Negative for abdominal pain, constipation, diarrhea, nausea and vomiting.   Genitourinary:  Negative for dysuria.   Neurological:  Negative for dizziness, headaches, light-headedness and weakness.     Objective:     Vital Signs (Most Recent):  Temp: 97.6 °F (36.4 °C) (12/22/23 0720)  Pulse: 63 (12/22/23 0730)  Resp: 18 (12/22/23 0730)  BP: 134/63 (12/22/23 0720)  SpO2: 97 % (12/22/23 0730) Vital Signs (24h Range):  Temp:  [97 °F (36.1 °C)-98.3 °F (36.8 °C)] 97.6 °F (36.4 °C)  Pulse:  [45-64] 63  Resp:  [10-18] 18  SpO2:  [91 %-100 %] 97 %  BP: ()/(45-68) 134/63  Arterial Line BP: ()/(28-61) 136/61     Weight: 76.1 kg (167 lb 12.3 oz)  Body mass index is 24.07 kg/m².     SpO2: 97 %         Intake/Output Summary (Last 24 hours) at 12/22/2023 0955  Last data filed at 12/22/2023 0839  Gross per 24 hour   Intake 913.26 ml   Output 1490 ml   Net -576.74 ml       Lines/Drains/Airways       Central Venous Catheter Line  Duration             Introducer 12/21/23 0643 1 day              Peripheral Intravenous Line  Duration                  Peripheral IV - Single Lumen 12/21/23 0631 20 G Left;Posterior Forearm 1 day         Peripheral IV - Single Lumen 12/21/23 1015 18 G Anterior;Proximal;Right Antecubital <1 day                       Physical Exam  Constitutional:       General: He  is not in acute distress.     Appearance: Normal appearance. He is not ill-appearing, toxic-appearing or diaphoretic.   HENT:      Head: Normocephalic and atraumatic.      Nose: Nose normal.   Eyes:      Extraocular Movements: Extraocular movements intact.      Pupils: Pupils are equal, round, and reactive to light.   Cardiovascular:      Rate and Rhythm: Normal rate and regular rhythm.      Heart sounds:      No friction rub. No gallop.   Pulmonary:      Effort: Pulmonary effort is normal. No respiratory distress.      Breath sounds: Normal breath sounds. No wheezing or rales.   Abdominal:      General: Abdomen is flat. There is no distension.      Palpations: Abdomen is soft. There is no mass.      Tenderness: There is no abdominal tenderness.   Musculoskeletal:         General: No swelling. Normal range of motion.      Cervical back: Normal range of motion. No rigidity.      Right lower leg: No edema.      Left lower leg: No edema.   Skin:     General: Skin is warm and dry.      Coloration: Skin is not jaundiced.      Findings: No bruising.   Neurological:      General: No focal deficit present.      Mental Status: He is alert and oriented to person, place, and time.      Cranial Nerves: No cranial nerve deficit.      Motor: No weakness.            Significant Labs: All pertinent lab results from the last 24 hours have been reviewed.    Significant Imaging: EKG: sinus bradycardia.

## 2023-12-26 LAB
POC ACTIVATED CLOTTING TIME K: 141 SEC (ref 74–137)
POC ACTIVATED CLOTTING TIME K: 266 SEC (ref 74–137)
SAMPLE: ABNORMAL
SAMPLE: ABNORMAL

## 2024-02-06 ENCOUNTER — HOSPITAL ENCOUNTER (OUTPATIENT)
Dept: CARDIOLOGY | Facility: HOSPITAL | Age: 69
Discharge: HOME | End: 2024-02-06
Attending: INTERNAL MEDICINE
Payer: COMMERCIAL

## 2024-02-06 ENCOUNTER — OFFICE VISIT (OUTPATIENT)
Dept: CARDIOLOGY | Facility: CLINIC | Age: 69
End: 2024-02-06
Payer: COMMERCIAL

## 2024-02-06 VITALS
HEART RATE: 64 BPM | WEIGHT: 175 LBS | SYSTOLIC BLOOD PRESSURE: 132 MMHG | BODY MASS INDEX: 25.05 KG/M2 | HEIGHT: 70 IN | DIASTOLIC BLOOD PRESSURE: 66 MMHG

## 2024-02-06 VITALS
OXYGEN SATURATION: 98 % | BODY MASS INDEX: 25.73 KG/M2 | SYSTOLIC BLOOD PRESSURE: 145 MMHG | HEIGHT: 70 IN | WEIGHT: 179.69 LBS | DIASTOLIC BLOOD PRESSURE: 55 MMHG | HEART RATE: 62 BPM

## 2024-02-06 DIAGNOSIS — Z95.2 S/P TAVR (TRANSCATHETER AORTIC VALVE REPLACEMENT): Primary | ICD-10-CM

## 2024-02-06 DIAGNOSIS — I25.119 CORONARY ARTERY DISEASE INVOLVING NATIVE CORONARY ARTERY OF NATIVE HEART WITH ANGINA PECTORIS: ICD-10-CM

## 2024-02-06 DIAGNOSIS — I48.0 PAROXYSMAL ATRIAL FIBRILLATION: ICD-10-CM

## 2024-02-06 DIAGNOSIS — I35.0 AORTIC STENOSIS, SEVERE: ICD-10-CM

## 2024-02-06 LAB
ASCENDING AORTA: 2.96 CM
AV INDEX (PROSTH): 0.46
AV MEAN GRADIENT: 15 MMHG
AV PEAK GRADIENT: 25 MMHG
AV VALVE AREA BY VELOCITY RATIO: 1.68 CM²
AV VALVE AREA: 1.69 CM²
AV VELOCITY RATIO: 0.46
BSA FOR ECHO PROCEDURE: 1.98 M2
CV ECHO LV RWT: 0.41 CM
DOP CALC AO PEAK VEL: 2.5 M/S
DOP CALC AO VTI: 60.24 CM
DOP CALC LVOT AREA: 3.7 CM2
DOP CALC LVOT DIAMETER: 2.16 CM
DOP CALC LVOT PEAK VEL: 1.15 M/S
DOP CALC LVOT STROKE VOLUME: 101.85 CM3
DOP CALCLVOT PEAK VEL VTI: 27.81 CM
E WAVE DECELERATION TIME: 304.75 MSEC
E/A RATIO: 1.25
E/E' RATIO: 13.05 M/S
ECHO LV POSTERIOR WALL: 1.05 CM (ref 0.6–1.1)
FRACTIONAL SHORTENING: 39 % (ref 28–44)
INTERVENTRICULAR SEPTUM: 1.16 CM (ref 0.6–1.1)
LA MAJOR: 5.41 CM
LA MINOR: 5.46 CM
LA WIDTH: 4.95 CM
LEFT ATRIUM SIZE: 4.32 CM
LEFT ATRIUM VOLUME INDEX MOD: 32.7 ML/M2
LEFT ATRIUM VOLUME INDEX: 50.1 ML/M2
LEFT ATRIUM VOLUME MOD: 64.45 CM3
LEFT ATRIUM VOLUME: 98.79 CM3
LEFT INTERNAL DIMENSION IN SYSTOLE: 3.12 CM (ref 2.1–4)
LEFT VENTRICLE DIASTOLIC VOLUME INDEX: 64.02 ML/M2
LEFT VENTRICLE DIASTOLIC VOLUME: 126.11 ML
LEFT VENTRICLE MASS INDEX: 111 G/M2
LEFT VENTRICLE SYSTOLIC VOLUME INDEX: 19.6 ML/M2
LEFT VENTRICLE SYSTOLIC VOLUME: 38.59 ML
LEFT VENTRICULAR INTERNAL DIMENSION IN DIASTOLE: 5.14 CM (ref 3.5–6)
LEFT VENTRICULAR MASS: 217.98 G
LV LATERAL E/E' RATIO: 10.33 M/S
LV SEPTAL E/E' RATIO: 17.71 M/S
MV A" WAVE DURATION": 13.32 MSEC
MV PEAK A VEL: 0.99 M/S
MV PEAK E VEL: 1.24 M/S
MV STENOSIS PRESSURE HALF TIME: 88.38 MS
MV VALVE AREA P 1/2 METHOD: 2.49 CM2
PISA TR MAX VEL: 2.47 M/S
PULM VEIN S/D RATIO: 1.19
PV PEAK D VEL: 0.57 M/S
PV PEAK S VEL: 0.68 M/S
RA MAJOR: 5.51 CM
RA PRESSURE ESTIMATED: 3 MMHG
RA WIDTH: 3.74 CM
RIGHT VENTRICULAR END-DIASTOLIC DIMENSION: 4.54 CM
RV TB RVSP: 5 MMHG
SINUS: 3.17 CM
STJ: 2.85 CM
TDI LATERAL: 0.12 M/S
TDI SEPTAL: 0.07 M/S
TDI: 0.1 M/S
TR MAX PG: 24 MMHG
TRICUSPID ANNULAR PLANE SYSTOLIC EXCURSION: 2.26 CM
TV REST PULMONARY ARTERY PRESSURE: 27 MMHG
Z-SCORE OF LEFT VENTRICULAR DIMENSION IN END DIASTOLE: -0.97
Z-SCORE OF LEFT VENTRICULAR DIMENSION IN END SYSTOLE: -0.86

## 2024-02-06 PROCEDURE — 93306 TTE W/DOPPLER COMPLETE: CPT | Mod: 26,,, | Performed by: INTERNAL MEDICINE

## 2024-02-06 PROCEDURE — 99999 PR PBB SHADOW E&M-EST. PATIENT-LVL IV: CPT | Mod: PBBFAC,,, | Performed by: INTERNAL MEDICINE

## 2024-02-06 PROCEDURE — 99215 OFFICE O/P EST HI 40 MIN: CPT | Mod: S$GLB,,, | Performed by: INTERNAL MEDICINE

## 2024-02-06 PROCEDURE — 3077F SYST BP >= 140 MM HG: CPT | Mod: CPTII,S$GLB,, | Performed by: INTERNAL MEDICINE

## 2024-02-06 PROCEDURE — 1159F MED LIST DOCD IN RCRD: CPT | Mod: CPTII,S$GLB,, | Performed by: INTERNAL MEDICINE

## 2024-02-06 PROCEDURE — 93306 TTE W/DOPPLER COMPLETE: CPT

## 2024-02-06 PROCEDURE — 3288F FALL RISK ASSESSMENT DOCD: CPT | Mod: CPTII,S$GLB,, | Performed by: INTERNAL MEDICINE

## 2024-02-06 PROCEDURE — 1101F PT FALLS ASSESS-DOCD LE1/YR: CPT | Mod: CPTII,S$GLB,, | Performed by: INTERNAL MEDICINE

## 2024-02-06 PROCEDURE — 3078F DIAST BP <80 MM HG: CPT | Mod: CPTII,S$GLB,, | Performed by: INTERNAL MEDICINE

## 2024-02-06 PROCEDURE — 1126F AMNT PAIN NOTED NONE PRSNT: CPT | Mod: CPTII,S$GLB,, | Performed by: INTERNAL MEDICINE

## 2024-02-06 PROCEDURE — 3008F BODY MASS INDEX DOCD: CPT | Mod: CPTII,S$GLB,, | Performed by: INTERNAL MEDICINE

## 2024-02-06 NOTE — PROGRESS NOTES
[facility]  PATIENT: Asif Nolen  MRN: [id number]   PCP: K[facility]ij[facility] Primary Doctor   Date of Service: 02/06/2024    Chief Complaint   Patient presents with    Coronary Artery Disease       History of Present Illness:   67 y.o. 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     Patient returns to the clinic as a one month follow up post Janes TAVR with 26 mm Katie S3 valve. He states his dyspnea on exertion has significantly improved and his orthopnea has resolved. His coworkers have noticed that even his voice improved. He has no other complaints. He wants to know if it is safe to proceed with EGD for varices evaluation. He is not taking any blood thinners.    Past Medical History:   Diagnosis Date    Arthritis     Cataract     Coronary artery disease     Digestive disorder     Encounter for blood transfusion 04/13/2023    Heart disease     Hypertension     Type 2 diabetes mellitus without complication, without long-term current use of insulin 12/30/2022    Vision loss      Review of patient's allergies indicates:  No Known Allergies  Family History   Problem Relation Age of Onset    No Known Problems Mother     Heart disease Father     Amblyopia Neg Hx     Blindness Neg Hx     Cataracts Neg Hx     Glaucoma Neg Hx     Macular degeneration Neg Hx     Retinal detachment Neg Hx     Strabismus Neg Hx      Social History:  Social History     Tobacco Use    Smoking status: Former     Types: Cigarettes    Smokeless tobacco: Never    Tobacco comments:     Quit smoking 17      years ago   Substance Use Topics    Alcohol use: Not Currently     Medications have been reviewed and reconciled.  Outpatient Medications Marked as Taking for the 2/6/24 encounter (Office Visit) with Reinier Haley MD   Medication Sig Dispense Refill     "ascorbic acid, vitamin C, (VITAMIN C) 500 MG tablet Take 500 mg by mouth once daily.      calcium carbonate/vitamin D3 (CALCIUM 600 + D,3, ORAL) Take 1 tablet by mouth Daily.      cholestyramine-aspartame (CHOLESTYRAMINE LIGHT) 4 gram PwPk Take 1 packet (4 g total) by mouth 2 (two) times daily. 60 packet 3    cyanocobalamin (VITAMIN B-12) 1000 MCG tablet Take 5,000 mcg by mouth once daily.      ergocalciferol, vitamin D2, (VITAMIN D ORAL) Take 1 tablet by mouth once daily.      furosemide (LASIX) 40 MG tablet Take 1 tablet (40 mg total) by mouth once daily. 30 tablet 4    lactulose (CHRONULAC) 10 gram/15 mL solution Take 15 mLs (10 g total) by mouth 2 (two) times daily. 900 mL 4    omega-3 fatty acids/fish oil (FISH OIL-OMEGA-3 FATTY ACIDS) 300-1,000 mg capsule Take 1 capsule by mouth once daily.      omeprazole (PRILOSEC) 10 MG capsule Take 10 mg by mouth once daily.      prednisoLONE acetate (PRED FORTE) 1 % DrpS Place 1 drop into the left eye 4 (four) times daily. 15 mL 6       Review of Systems:  Pertinent positive and negative as mentioned below, otherwise a full review of systems was negative.  Cardiac- No palpitations,  No syncope, No PND, No orthopnea,   Pulmonary- No cough, No wheezing, No stridor  Gastrointestinal- No dysphagia,  No vomiting, No constipation, No diarrhea    Physical Exam:  Vitals reviewed,   BP (!) 145/55 (BP Location: Right arm, Patient Position: Sitting, BP Method: Large (Automatic))   Pulse 62   Ht 5' 10" (1.778 m)   Wt 81.5 kg (179 lb 10.8 oz)   SpO2 98%   BMI 25.78 kg/m²   Physical Exam  Constitutional:       General: He is not in acute distress.     Appearance: Normal appearance. He is normal weight. He is not ill-appearing.   HENT:      Head: Normocephalic and atraumatic.      Mouth/Throat:      Mouth: Mucous membranes are moist.   Cardiovascular:      Rate and Rhythm: Normal rate and regular rhythm.      Pulses: Normal pulses.      Heart sounds: Murmur heard.   Pulmonary:      " "Effort: Pulmonary effort is normal. No respiratory distress.      Breath sounds: No wheezing or rales.   Abdominal:      General: Abdomen is flat. Bowel sounds are normal. There is no distension.      Palpations: Abdomen is soft.   Musculoskeletal:         General: No swelling or tenderness.      Right lower leg: No edema.      Left lower leg: No edema.   Skin:     General: Skin is warm.      Coloration: Skin is not jaundiced.   Neurological:      General: No focal deficit present.      Mental Status: He is alert and oriented to person, place, and time.   Psychiatric:         Mood and Affect: Mood normal.     Data/results:  Laboratory Tests, reviewed today   Lab Results   Component Value Date     02/06/2024     08/14/2019    K 3.6 02/06/2024    K 3.7 08/14/2019     02/06/2024    CO2 28 02/06/2024    CO2 29 08/14/2019    BUN 14 02/06/2024    CREATININE 1.0 02/06/2024    CREATININE 0.75 08/14/2019    ANIONGAP 7 (L) 02/06/2024     No results found for: "HGBA1C"  Lab Results   Component Value Date     (H) 12/21/2023     (H) 05/15/2023    Lab Results   Component Value Date    WBC 3.89 (L) 02/06/2024    HGB 8.9 (L) 02/06/2024    HCT 28.3 (L) 02/06/2024    PLT 94 (L) 02/06/2024    GRAN 2.5 02/06/2024    GRAN 64.5 02/06/2024     No results found for: "CHOL", "HDL", "LDLCALC", "TRIG"       Images   Coronary angiography findings/intervention (images independently visualized the images and personally interpreted):  Old records from the chart reviewed.    Assessment and Plan  Asif was seen today for coronary artery disease.    Diagnoses and all orders for this visit:    S/P TAVR (transcatheter aortic valve replacement)  No paravalvular leak on today's echo  Symptoms improved post-TAVR  Recommend evaluation of anemia  Follow up in 11 months    Porter Limon MD  PGY IV Cardiology    I have seen the patient, reviewed the Fellow's history and physical, assessment and plan. I have personally " interviewed and examined the patient and agree with the findings.   Coronary artery disease involving native coronary artery of native heart with angina pectoris  Stable,   Continue current meds.   Follow up with primary cardiology.     Paroxysmal atrial fibrillation  Rate controlled,   Followed in [place] by Dr. Granados.   Consider LAAO or anticoagulation per Dr. Granados.   Follow up with me prn.        NICOLE Haley MD

## 2024-02-06 NOTE — ASSESSMENT & PLAN NOTE
Rate controlled,   Followed in [place] by Dr. Granados.   Consider LAAO or anticoagulation per Dr. Granados.   Follow up with me prn.

## 2024-02-07 DIAGNOSIS — Z95.2 S/P TAVR (TRANSCATHETER AORTIC VALVE REPLACEMENT): Primary | ICD-10-CM

## 2025-03-27 PROBLEM — K70.30 ALCOHOLIC CIRRHOSIS OF LIVER WITHOUT ASCITES: Status: ACTIVE | Noted: 2025-03-27

## 2025-04-07 ENCOUNTER — TELEPHONE (OUTPATIENT)
Dept: TRANSPLANT | Facility: CLINIC | Age: 70
End: 2025-04-07
Payer: COMMERCIAL

## 2025-04-07 NOTE — TELEPHONE ENCOUNTER
----- Message from Ana Hernandez MD sent at 4/7/2025 11:38 AM CDT -----  That would be great. Thanks!SA  ----- Message -----  From: Hermila Weller  Sent: 4/7/2025  11:33 AM CDT  To: Ana Hernandez MD    To clarify you want to add him to Tuesday discussion?  ----- Message -----  From: Ana Hernandez MD  Sent: 4/3/2025  11:03 AM CDT  To: Hermila Weller; Luis Mckinley MD    Decompensated alcohol/MASH cirrhosis. His main decompensation is EV requiring banding. Significant cardiac history. Meld is 10-12. Will request discussion for documentation for his transplant candidacy. Thanks!

## 2025-04-07 NOTE — TELEPHONE ENCOUNTER
Liver Transplant Committee Discussion     Patient Name: Asif Nolen   : 1955  MRN: [id number]    Requested by:  Ana Hernandez    Day to be discussed: Liver discussion days: Tuesday    Transplant Coordinator: Liver Coordinators: Lacey Weller    Patient Status: outpatient    Transplant Status: is pt a candidate    Reason for Discussion:Decompensated alcohol/MASH cirrhosis. His main decompensation is EV requiring banding. Significant cardiac history. Meld is 10-12. Will request discussion for documentation for his transplant candidacy.     Plan:PLAN : Per discussion in liver committee. Due to cardiac history in regards to liver txp candidacy. Requesting Anesthesia review      Route to:lacey / Dr Hernandez

## 2025-04-10 NOTE — TELEPHONE ENCOUNTER
PLAN : Per discussion in liver committee. Due to cardiac history in regards to liver txp candidacy. Requesting Anesthesia review .

## 2025-05-23 ENCOUNTER — TELEPHONE (OUTPATIENT)
Dept: TRANSPLANT | Facility: CLINIC | Age: 70
End: 2025-05-23
Payer: COMMERCIAL

## 2025-05-23 NOTE — TELEPHONE ENCOUNTER
Spoke to pt re possible liver txp evaluation. Per pt and wife they understood he was too soon for transplant due to low meld. I informed the pt that yes his meld is still low and since he is doing well with no complications it can be too early for evaluation.  Pt understands Dr Hernandez is leaving and he will need to be assigned a new Hepatologist.  They agreed to set up the patient portal to do a virtual appt.     Informed I will ck next week for confirmation of [facility].    They do understand we will obtain labs prior to the appt.

## 2025-05-27 ENCOUNTER — TELEPHONE (OUTPATIENT)
Dept: TRANSPLANT | Facility: CLINIC | Age: 70
End: 2025-05-27
Payer: COMMERCIAL

## 2025-06-11 ENCOUNTER — TELEPHONE (OUTPATIENT)
Dept: TRANSPLANT | Facility: CLINIC | Age: 70
End: 2025-06-11
Payer: COMMERCIAL

## 2025-06-11 NOTE — TELEPHONE ENCOUNTER
----- Message from [employer] sent at 6/11/2025  3:53 PM CDT -----  Called [phone] (Mobile) and sp to Tamia (Spouse) about getting pt [facility]'ed with a new Hepat MD,but pt wife said that the pt has already seen another provider and rec'camila a bill from, Dr. Valerio, in [place]. Pt wife wants to know if they still need to see hepatology if the pt does not need a txp at this time. Will let nurse know. Pt wife also expressed that Dr. Valerio wanted the pt to have an EGD done, but have not heard back from the clinic. Told her that endoscopy in [place] did reach out to them to [facility], but where told that the pt would call back to [facility] when he is ready because he was at work. Told pt wife that I will send a message to the nurses in [place] to see if they can reach back out to [facility].

## 2025-06-16 ENCOUNTER — TELEPHONE (OUTPATIENT)
Dept: TRANSPLANT | Facility: CLINIC | Age: 70
End: 2025-06-16
Payer: COMMERCIAL

## 2025-06-16 NOTE — TELEPHONE ENCOUNTER
Spoke to wife, she still wants to follow in [place] with Dr Valerio/Yoni. She stated if transplant happens they may consider [place] due to distance.     I sent a message to Dr Vallejo staff for pt follow up appt in July.     Episode closed.

## (undated) DEVICE — GUIDEWIRE SAFARI2 XS CRV 275CM

## (undated) DEVICE — LINE 60IN PRESSURE MON.

## (undated) DEVICE — CATH ALII 4FR INFINITY

## (undated) DEVICE — GUIDEWIRE EMERALD 150CM PTFE

## (undated) DEVICE — SHEATH INTRODUCER 6FR 11CM

## (undated) DEVICE — OMNIPAQUE 350MG 150ML VIAL

## (undated) DEVICE — SPIKE SHORT LG BORE 1-WAY 2IN

## (undated) DEVICE — GUIDEWIRE AMPLATZ .035X260

## (undated) DEVICE — DEVICE PERCLOSE SUT CLSR 6FR

## (undated) DEVICE — TRAY CATH LAB OMC

## (undated) DEVICE — COVER PROBE US 5.5X58L NON LTX

## (undated) DEVICE — Device

## (undated) DEVICE — CABLE PACER

## (undated) DEVICE — CATH TEMP PACER 5.0FR

## (undated) DEVICE — GUIDEWIRE SUPRA CORE 035 190CM

## (undated) DEVICE — CATH MPA2 INFINITI 4FR 100CM

## (undated) DEVICE — STOPCOCK 3-WAY

## (undated) DEVICE — SHEATH INTRODUCER 4FR 11CM

## (undated) DEVICE — CATH INFINITI JUDKINS JR4

## (undated) DEVICE — TUBING HPCIL ROT M/F ADPT 10IN

## (undated) DEVICE — SHEATH INTRODUCER 8FR 11CM

## (undated) DEVICE — CATH IMA INFINITI 4FRX100CM

## (undated) DEVICE — SNAP CAP 18 DOME COVERS

## (undated) DEVICE — COVER TABLE 44X90 STERILE

## (undated) DEVICE — PAD DEFIB CADENCE ADULT R2

## (undated) DEVICE — KIT MICROINTRO 4F .018X40X7CM

## (undated) DEVICE — KIT MNTR POLE MT DUL 12&48 MAC

## (undated) DEVICE — KIT CUSTOM MANIFOLD

## (undated) DEVICE — KIT PERCUTANEOUS SHEATH

## (undated) DEVICE — PRESTO INFLATION DEVICE

## (undated) DEVICE — CATH IMPULSE PIGTAIL 6F 110CM